# Patient Record
Sex: MALE | Race: WHITE | NOT HISPANIC OR LATINO | URBAN - METROPOLITAN AREA
[De-identification: names, ages, dates, MRNs, and addresses within clinical notes are randomized per-mention and may not be internally consistent; named-entity substitution may affect disease eponyms.]

---

## 2023-09-21 ENCOUNTER — HOSPITAL ENCOUNTER (OUTPATIENT)
Dept: DATA CONVERSION | Facility: HOSPITAL | Age: 68
Discharge: HOME | End: 2023-09-21
Payer: MEDICARE

## 2023-09-21 DIAGNOSIS — M47.812 SPONDYLOSIS WITHOUT MYELOPATHY OR RADICULOPATHY, CERVICAL REGION: ICD-10-CM

## 2024-01-02 PROCEDURE — 0753T DGTZ GLS MCRSCP SLD LEVEL IV: CPT

## 2024-01-02 PROCEDURE — 88305 TISSUE EXAM BY PATHOLOGIST: CPT | Performed by: PATHOLOGY

## 2024-01-02 PROCEDURE — 88313 SPECIAL STAINS GROUP 2: CPT | Performed by: PATHOLOGY

## 2024-01-02 PROCEDURE — 0757T DGTZ GLS MCRSCP SL SPC GRPII: CPT

## 2024-01-02 PROCEDURE — 88305 TISSUE EXAM BY PATHOLOGIST: CPT

## 2024-01-02 PROCEDURE — 88313 SPECIAL STAINS GROUP 2: CPT

## 2024-01-04 ENCOUNTER — LAB REQUISITION (OUTPATIENT)
Dept: LAB | Facility: HOSPITAL | Age: 69
End: 2024-01-04
Payer: MEDICARE

## 2024-01-04 DIAGNOSIS — M06.9 RHEUMATOID ARTHRITIS, UNSPECIFIED (MULTI): ICD-10-CM

## 2024-01-04 DIAGNOSIS — G56.01 CARPAL TUNNEL SYNDROME, RIGHT UPPER LIMB: ICD-10-CM

## 2024-01-10 LAB
LABORATORY COMMENT REPORT: NORMAL
PATH REPORT.FINAL DX SPEC: NORMAL
PATH REPORT.GROSS SPEC: NORMAL
PATH REPORT.MICROSCOPIC SPEC OTHER STN: NORMAL
PATH REPORT.RELEVANT HX SPEC: NORMAL
PATH REPORT.TOTAL CANCER: NORMAL

## 2025-06-02 ENCOUNTER — HOSPITAL ENCOUNTER (OUTPATIENT)
Dept: RADIOLOGY | Facility: HOSPITAL | Age: 70
Discharge: HOME | End: 2025-06-02
Payer: MEDICARE

## 2025-06-02 DIAGNOSIS — M75.122 COMPLETE ROTATOR CUFF TEAR OR RUPTURE OF LEFT SHOULDER, NOT SPECIFIED AS TRAUMATIC: ICD-10-CM

## 2025-06-02 PROCEDURE — 73221 MRI JOINT UPR EXTREM W/O DYE: CPT | Mod: LT

## 2025-06-02 PROCEDURE — 73221 MRI JOINT UPR EXTREM W/O DYE: CPT | Mod: LEFT SIDE | Performed by: RADIOLOGY

## 2025-07-02 ENCOUNTER — HOSPITAL ENCOUNTER (OUTPATIENT)
Dept: RADIOLOGY | Facility: HOSPITAL | Age: 70
Discharge: HOME | End: 2025-07-02
Payer: MEDICARE

## 2025-07-02 DIAGNOSIS — M75.122 COMPLETE ROTATOR CUFF TEAR OR RUPTURE OF LEFT SHOULDER, NOT SPECIFIED AS TRAUMATIC: ICD-10-CM

## 2025-07-02 PROCEDURE — 73200 CT UPPER EXTREMITY W/O DYE: CPT | Mod: LT

## 2025-07-02 PROCEDURE — 73200 CT UPPER EXTREMITY W/O DYE: CPT | Mod: LEFT SIDE | Performed by: RADIOLOGY

## 2025-07-17 ENCOUNTER — PRE-ADMISSION TESTING (OUTPATIENT)
Dept: PREADMISSION TESTING | Facility: HOSPITAL | Age: 70
End: 2025-07-17
Payer: MEDICARE

## 2025-07-17 VITALS
HEIGHT: 70 IN | OXYGEN SATURATION: 98 % | DIASTOLIC BLOOD PRESSURE: 69 MMHG | WEIGHT: 295 LBS | HEART RATE: 76 BPM | RESPIRATION RATE: 16 BRPM | BODY MASS INDEX: 42.23 KG/M2 | TEMPERATURE: 96.6 F | SYSTOLIC BLOOD PRESSURE: 135 MMHG

## 2025-07-17 DIAGNOSIS — I10 HYPERTENSION, UNSPECIFIED TYPE: ICD-10-CM

## 2025-07-17 DIAGNOSIS — Z01.818 PREOP TESTING: Primary | ICD-10-CM

## 2025-07-17 LAB
ANION GAP SERPL CALCULATED.3IONS-SCNC: 14 MMOL/L (ref 10–20)
ATRIAL RATE: 71 BPM
BASOPHILS # BLD AUTO: 0.06 X10*3/UL (ref 0–0.1)
BASOPHILS NFR BLD AUTO: 1.1 %
BUN SERPL-MCNC: 20 MG/DL (ref 6–23)
CALCIUM SERPL-MCNC: 9.4 MG/DL (ref 8.6–10.3)
CHLORIDE SERPL-SCNC: 103 MMOL/L (ref 98–107)
CO2 SERPL-SCNC: 27 MMOL/L (ref 21–32)
CREAT SERPL-MCNC: 0.87 MG/DL (ref 0.5–1.3)
EGFRCR SERPLBLD CKD-EPI 2021: >90 ML/MIN/1.73M*2
EOSINOPHIL # BLD AUTO: 0.25 X10*3/UL (ref 0–0.7)
EOSINOPHIL NFR BLD AUTO: 4.7 %
ERYTHROCYTE [DISTWIDTH] IN BLOOD BY AUTOMATED COUNT: 13.3 % (ref 11.5–14.5)
GLUCOSE SERPL-MCNC: 127 MG/DL (ref 74–99)
HCT VFR BLD AUTO: 41 % (ref 41–52)
HGB BLD-MCNC: 13.4 G/DL (ref 13.5–17.5)
IMM GRANULOCYTES # BLD AUTO: 0.03 X10*3/UL (ref 0–0.7)
IMM GRANULOCYTES NFR BLD AUTO: 0.6 % (ref 0–0.9)
LYMPHOCYTES # BLD AUTO: 1.71 X10*3/UL (ref 1.2–4.8)
LYMPHOCYTES NFR BLD AUTO: 32 %
MCH RBC QN AUTO: 32.1 PG (ref 26–34)
MCHC RBC AUTO-ENTMCNC: 32.7 G/DL (ref 32–36)
MCV RBC AUTO: 98 FL (ref 80–100)
MONOCYTES # BLD AUTO: 0.52 X10*3/UL (ref 0.1–1)
MONOCYTES NFR BLD AUTO: 9.7 %
NEUTROPHILS # BLD AUTO: 2.77 X10*3/UL (ref 1.2–7.7)
NEUTROPHILS NFR BLD AUTO: 51.9 %
NRBC BLD-RTO: 0 /100 WBCS (ref 0–0)
P AXIS: 49 DEGREES
P OFFSET: 169 MS
P ONSET: 115 MS
PLATELET # BLD AUTO: 218 X10*3/UL (ref 150–450)
POTASSIUM SERPL-SCNC: 4.2 MMOL/L (ref 3.5–5.3)
PR INTERVAL: 210 MS
Q ONSET: 220 MS
QRS COUNT: 12 BEATS
QRS DURATION: 90 MS
QT INTERVAL: 382 MS
QTC CALCULATION(BAZETT): 415 MS
QTC FREDERICIA: 404 MS
R AXIS: 28 DEGREES
RBC # BLD AUTO: 4.18 X10*6/UL (ref 4.5–5.9)
SODIUM SERPL-SCNC: 140 MMOL/L (ref 136–145)
T AXIS: 50 DEGREES
T OFFSET: 411 MS
VENTRICULAR RATE: 71 BPM
WBC # BLD AUTO: 5.3 X10*3/UL (ref 4.4–11.3)

## 2025-07-17 PROCEDURE — 93005 ELECTROCARDIOGRAM TRACING: CPT

## 2025-07-17 PROCEDURE — 93010 ELECTROCARDIOGRAM REPORT: CPT | Performed by: INTERNAL MEDICINE

## 2025-07-17 PROCEDURE — 80048 BASIC METABOLIC PNL TOTAL CA: CPT

## 2025-07-17 PROCEDURE — 87081 CULTURE SCREEN ONLY: CPT | Mod: TRILAB

## 2025-07-17 PROCEDURE — 85025 COMPLETE CBC W/AUTO DIFF WBC: CPT

## 2025-07-17 PROCEDURE — 36415 COLL VENOUS BLD VENIPUNCTURE: CPT

## 2025-07-17 RX ORDER — HYDROCODONE BITARTRATE AND ACETAMINOPHEN 10; 325 MG/1; MG/1
1 TABLET ORAL EVERY 4 HOURS PRN
COMMUNITY

## 2025-07-17 RX ORDER — CARISOPRODOL 350 MG/1
350 TABLET ORAL 3 TIMES DAILY PRN
COMMUNITY

## 2025-07-17 RX ORDER — CHLORHEXIDINE GLUCONATE ORAL RINSE 1.2 MG/ML
SOLUTION DENTAL
Qty: 473 ML | Refills: 0 | Status: SHIPPED | OUTPATIENT
Start: 2025-07-17 | End: 2025-08-01

## 2025-07-17 RX ORDER — DEXTROMETHORPHAN HYDROBROMIDE, GUAIFENESIN 5; 100 MG/5ML; MG/5ML
1300 LIQUID ORAL EVERY 8 HOURS PRN
COMMUNITY

## 2025-07-17 RX ORDER — LISINOPRIL 40 MG/1
40 TABLET ORAL DAILY
COMMUNITY

## 2025-07-17 RX ORDER — VERAPAMIL HYDROCHLORIDE 180 MG/1
180 CAPSULE, DELAYED RELEASE ORAL DAILY
COMMUNITY

## 2025-07-17 RX ORDER — NALOXONE HYDROCHLORIDE 4 MG/.1ML
1 SPRAY NASAL AS NEEDED
COMMUNITY
Start: 2025-06-11

## 2025-07-17 RX ORDER — CYANOCOBALAMIN 1000 UG/ML
1000 INJECTION, SOLUTION INTRAMUSCULAR; SUBCUTANEOUS ONCE
COMMUNITY

## 2025-07-17 RX ORDER — AMOXICILLIN 500 MG/1
CAPSULE ORAL
COMMUNITY

## 2025-07-17 ASSESSMENT — DUKE ACTIVITY SCORE INDEX (DASI)
CAN YOU WALK INDOORS, SUCH AS AROUND YOUR HOUSE: YES
CAN YOU PARTICIPATE IN STRENOUS SPORTS LIKE SWIMMING, SINGLES TENNIS, FOOTBALL, BASKETBALL, OR SKIING: NO
CAN YOU DO YARD WORK LIKE RAKING LEAVES, WEEDING OR PUSHING A MOWER: NO
CAN YOU CLIMB A FLIGHT OF STAIRS OR WALK UP A HILL: YES
CAN YOU DO MODERATE WORK AROUND THE HOUSE LIKE VACUUMING, SWEEPING FLOORS OR CARRYING GROCERIES: YES
CAN YOU TAKE CARE OF YOURSELF (EAT, DRESS, BATHE, OR USE TOILET): YES
CAN YOU PARTICIPATE IN MODERATE RECREATIONAL ACTIVITIES LIKE GOLF, BOWLING, DANCING, DOUBLES TENNIS OR THROWING A BASEBALL OR FOOTBALL: NO
CAN YOU HAVE SEXUAL RELATIONS: YES
CAN YOU DO LIGHT WORK AROUND THE HOUSE LIKE DUSTING OR WASHING DISHES: YES
TOTAL_SCORE: 24.2
CAN YOU DO HEAVY WORK AROUND THE HOUSE LIKE SCRUBBING FLOORS OR LIFTING AND MOVING HEAVY FURNITURE: NO
CAN YOU WALK A BLOCK OR TWO ON LEVEL GROUND: YES
DASI METS SCORE: 5.7
CAN YOU RUN A SHORT DISTANCE: NO

## 2025-07-17 ASSESSMENT — ENCOUNTER SYMPTOMS
RESPIRATORY NEGATIVE: 1
NEUROLOGICAL NEGATIVE: 1
BACK PAIN: 1
CARDIOVASCULAR NEGATIVE: 1
ARTHRALGIAS: 1
HEMATOLOGIC/LYMPHATIC NEGATIVE: 1
GASTROINTESTINAL NEGATIVE: 1
ENDOCRINE NEGATIVE: 1
EYES NEGATIVE: 1
PSYCHIATRIC NEGATIVE: 1
CONSTITUTIONAL NEGATIVE: 1

## 2025-07-17 NOTE — CPM/PAT H&P
CPM/PAT Evaluation       Name: Abhinav FABIAN Mandie (Augustinewill FABIAN [facility])  /Age: 1955/69 y.o.     In-Person       Chief Complaint: left shoulder pain     HPI    Pt is a 69 year old male with left shoulder pain. Pt reports he started experiencing left shoulder pain about one year ago that continues to worsening. Pt stated he has history of left shoulder rotator cuff repair surgery. Pt describes the pain as an aching sharp pain that radiates from his left shoulder to his left elbow. He is unable to raise his left arm above shoulder level. Pt denies weakness, numbness, and tingling in his left hand. Pt takes Norco for his chronic back pain and it helps mildly decrease his left shoulder pain. Pt was examined by his surgeon and has been scheduled for left total shoulder reverse arthroplasty. Pt denies CP, SOB, or dizziness.     Past Medical History:   Diagnosis Date    Amyloidosis     Depression     Hypertension     Kidney stones     Neuropathy     Spinal stenosis      Past Surgical History:   Procedure Laterality Date    ANKLE SURGERY Left     APPENDECTOMY      CARDIAC CATHETERIZATION      60% lesion in LAD, Normal EF; no intervention required.    CARPAL TUNNEL RELEASE Bilateral     FOOT SURGERY Left     KIDNEY STONE SURGERY      OTHER SURGICAL HISTORY  2020    Thoracic and lumbar vertebral fusion x 7 surgeries    OTHER SURGICAL HISTORY Bilateral 2020    Knee replacement    OTHER SURGICAL HISTORY  2020    ORIF metatarsal    OTHER SURGICAL HISTORY  2020    Neck surgery x 2 surgeries    OTHER SURGICAL HISTORY  2020    Appendectomy    OTHER SURGICAL HISTORY  2020    Shoulder replacement    ROTATOR CUFF REPAIR Left     UMBILICAL HERNIA REPAIR       Social History     Tobacco Use    Smoking status: Never    Smokeless tobacco: Never   Substance Use Topics    Alcohol use: Yes     Alcohol/week: 2.0 standard drinks of alcohol     Types: 2 Glasses of wine per week     Social History     Substance  and Sexual Activity   Drug Use Never     Patient  has no history on file for sexual activity.    Family History[1]    Allergies   Allergen Reactions    Hydromorphone Headache    Gabapentin Swelling     Current Outpatient Medications   Medication Sig Dispense Refill    naloxone (Narcan) 4 mg/0.1 mL nasal spray Administer 1 spray (4 mg) into affected nostril(s) if needed.      acetaminophen (Tylenol 8 HOUR) 650 mg ER tablet Take 2 tablets (1,300 mg) by mouth every 8 hours if needed for mild pain (1 - 3). Do not crush, chew, or split.      amoxicillin (Amoxil) 500 mg capsule TAKE 4 CAPSULES 1 HOUR PRIOR TO DENTAL WORK      carisoprodol (Soma) 350 mg tablet Take 1 tablet (350 mg) by mouth 3 times a day as needed for muscle spasms.      chlorhexidine (Peridex) 0.12 % solution Use as directed. 473 mL 0    cyanocobalamin (Vitamin B-12) 1,000 mcg/mL injection Inject 1 mL (1,000 mcg) into the muscle 1 time. Twice per month      HYDROcodone-acetaminophen (Norco)  mg tablet Take 1 tablet by mouth every 4 hours if needed.      lisinopril 40 mg tablet Take 1 tablet (40 mg) by mouth once daily.      verapamil ER (Veralan PM) 180 mg 24 hr capsule Take 1 capsule (180 mg) by mouth once daily.       No current facility-administered medications for this visit.     Review of Systems   Constitutional: Negative.    HENT: Negative.     Eyes: Negative.    Respiratory: Negative.     Cardiovascular: Negative.    Gastrointestinal: Negative.    Endocrine: Negative.    Genitourinary: Negative.    Musculoskeletal:  Positive for arthralgias and back pain.        Left shoulder pain and decreased ROM   Skin: Negative.    Neurological: Negative.    Hematological: Negative.         Pt has amyloidosis   Psychiatric/Behavioral: Negative.       Physical Exam  Vitals reviewed.   Constitutional:       Appearance: He is morbidly obese.   HENT:      Head: Normocephalic and atraumatic.      Nose: Nose normal.      Mouth/Throat:      Mouth: Mucous  "membranes are moist.      Pharynx: Oropharynx is clear.     Eyes:      Extraocular Movements: Extraocular movements intact.      Conjunctiva/sclera: Conjunctivae normal.      Pupils: Pupils are equal, round, and reactive to light.       Cardiovascular:      Rate and Rhythm: Normal rate and regular rhythm.      Pulses: Normal pulses.      Heart sounds: Normal heart sounds.   Pulmonary:      Effort: Pulmonary effort is normal. No respiratory distress.      Breath sounds: Normal breath sounds. No wheezing, rhonchi or rales.   Abdominal:      Palpations: Abdomen is soft.      Tenderness: There is no abdominal tenderness. There is no guarding or rebound.     Musculoskeletal:      Cervical back: Normal range of motion and neck supple.      Right lower leg: Edema (trace edema) present.      Left lower leg: Edema (trace edema) present.      Comments: Left shoulder pain with ROM; decreased left shoulder ROM. Unable to overhead lift. Pt ambulates with a cane d/t his chronic lower back pain.      Skin:     General: Skin is warm and dry.     Neurological:      General: No focal deficit present.      Mental Status: He is alert and oriented to person, place, and time. Mental status is at baseline.     Psychiatric:         Mood and Affect: Mood normal.         Behavior: Behavior normal.         Thought Content: Thought content normal.         Judgment: Judgment normal.          PAT AIRWAY:   Airway:     Mallampati::  III    TM distance::  >3 FB    Neck ROM::  Limited  normal      Visit Vitals  /69   Pulse 76   Temp 35.9 °C (96.6 °F) (Temporal)   Resp 16   Ht 1.778 m (5' 10\")   Wt 134 kg (295 lb)   SpO2 98%   BMI 42.33 kg/m²   Smoking Status Never   BSA 2.57 m²     ASA: 3  CHADS: 2.8%  RCRI: 0.4%  Ariscat: 1.6%  DASI Risk Score      Flowsheet Row Pre-Admission Testing from 7/17/2025 in [facility]   Can you take care of yourself (eat, dress, bathe, or use toilet)?  2.75 filed at 07/17/2025 0850   Can you walk " indoors, such as around your house? 1.75 filed at 07/17/2025 0850   Can you walk a block or two on level ground?  2.75 filed at 07/17/2025 0850   Can you climb a flight of stairs or walk up a hill? 5.5 filed at 07/17/2025 0850   Can you run a short distance? 0 filed at 07/17/2025 0850   Can you do light work around the house like dusting or washing dishes? 2.7 filed at 07/17/2025 0850   Can you do moderate work around the house like vacuuming, sweeping floors or carrying groceries? 3.5 filed at 07/17/2025 0850   Can you do heavy work around the house like scrubbing floors or lifting and moving heavy furniture?  0 filed at 07/17/2025 0850   Can you do yard work like raking leaves, weeding or pushing a mower? 0 filed at 07/17/2025 0850   Can you have sexual relations? 5.25 filed at 07/17/2025 0850   Can you participate in moderate recreational activities like golf, bowling, dancing, doubles tennis or throwing a baseball or football? 0 filed at 07/17/2025 0850   Can you participate in strenous sports like swimming, singles tennis, football, basketball, or skiing? 0 filed at 07/17/2025 0850   DASI SCORE 24.2 filed at 07/17/2025 0850   METS Score (Will be calculated only when all the questions are answered) 5.7 filed at 07/17/2025 0850     Caprini DVT Assessment    No data to display  Modified Frailty Index    No data to display  [id number] Stroke Risk Points  Current as of just now        N/A 0 to 9 Points:      Last Change: N/A          The [id number] risk score (Lip GH, et al. 2009. © 2010 American College [employer] Chest [employer]) quantifies the risk of stroke for a patient with atrial fibrillation. For patients without atrial fibrillation or under the age of 18 this score appears as N/A. Higher score values generally indicate higher risk of stroke.        This score is not applicable to this patient. Components are not calculated.          Revised Cardiac Risk Index      Flowsheet Row Pre-Admission Testing from  7/17/2025 in [facility]   High-Risk Surgery (Intraperitoneal, Intrathoracic,Suprainguinal vascular) 0 filed at 07/17/2025 0918   History of ischemic heart disease (History of MI, History of positive exercuse test, Current chest paint considered due to myocardial ischemia, Use of nitrate therapy, ECG with pathological Q Waves) 0 filed at 07/17/2025 0918   History of congestive heart failure (pulmonary edemia, bilateral rales or S3 gallop, Paroxysmal nocturnal dyspnea, CXR showing pulmonary vascular redistribution) 0 filed at 07/17/2025 0918   History of cerebrovascular disease (Prior TIA or stroke) 0 filed at 07/17/2025 0918   Pre-operative insulin treatment 0 filed at 07/17/2025 0918   Pre-operative creatinine>2 mg/dl 0 filed at 07/17/2025 0918   Revised Cardiac Risk Calculator 0 filed at 07/17/2025 0918     Apfel Simplified Score    No data to display  Risk Analysis Index Results This Encounter    No data found in the last 10 encounters.       Stop Bang Score      Flowsheet Row Pre-Admission Testing from 7/17/2025 in [facility]   Do you snore loudly? 0 filed at 07/17/2025 0850   Do you often feel tired or fatigued after your sleep? 0 filed at 07/17/2025 0850   Has anyone ever observed you stop breathing in your sleep? 0 filed at 07/17/2025 0850   Do you have or are you being treated for high blood pressure? 1 filed at 07/17/2025 0850   Recent BMI (Calculated) 42.3 filed at 07/17/2025 0850   Is BMI greater than 35 kg/m2? 1=Yes filed at 07/17/2025 0850   Age older than 50 years old? 1=Yes filed at 07/17/2025 0850   Is your neck circumference greater than 17 inches (Male) or 16 inches (Female)? 1 filed at 07/17/2025 0850   Gender - Male 1=Yes filed at 07/17/2025 0850   STOP-BANG Total Score 5 filed at 07/17/2025 0850     Prodigy: High Risk  Total Score: 19              Prodigy Age Score      Prodigy Gender Score     Prodigy Previous Opioid Use Score           ARISCAT Score for  Postoperative Pulmonary Complications    No data to display  Wilcox Perioperative Risk for Myocardial Infarction or Cardiac Arrest (ANAHI)    No data to display      Assessment and Plan:     Nontraumatic complete tear of rotator cuff, left: Total Shoulder Reverse Arthroplasty left.  HTN: Pt is taking verapamil ER and lisinopril.   CAD: cardiac cath 2016 60% lesion in LAD, normal EF; no intervention required- found in old record in [employer].   Amyloidosis: Pt followed up with both a cardiologist and hematologist. Pt has completed a work up with hematology and cardiology. Pt follows up in one year. Amyloid cardiac nuclear scan 8/9/2024: Not Consistent with TTR amyloidosis.   Chronic back pain: history of multiple neck and back surgeries. Pt takes Norco to help manage his pain.  BMI: 42.33    CBC, BMP, and MRSA swab collected in PAT.  EKG performed in PAT.    Echocardiogram 7/18/2024  - Exam indication: Amyloidosis   - The left ventricle is normal in size. Left ventricular systolic function is normal. EF = 55 ± 5% (2D biplane) Normal left ventricular diastolic function.   - The right ventricle is mildly dilated. Right ventricular systolic function is normal.   - The right atrial cavity is mildly dilated.   - Estimated right ventricular systolic pressure is not reported due to an insufficient tricuspid regurgitation signal. Estimated right atrial pressure is 3   mmHg based on IVC assessment.   - The patient has not had a prior CC echocardiographic exam for comparison. No   echo evidence for significant restrictive heart disease     MAEGAN Knott-CNP         [1] No family history on file.

## 2025-07-17 NOTE — H&P (VIEW-ONLY)
CPM/PAT Evaluation       Name: Abhinav FABIAN Mandie (Augustinewill FABIAN [facility])  /Age: 1955/69 y.o.     In-Person       Chief Complaint: left shoulder pain     HPI    Pt is a 69 year old male with left shoulder pain. Pt reports he started experiencing left shoulder pain about one year ago that continues to worsening. Pt stated he has history of left shoulder rotator cuff repair surgery. Pt describes the pain as an aching sharp pain that radiates from his left shoulder to his left elbow. He is unable to raise his left arm above shoulder level. Pt denies weakness, numbness, and tingling in his left hand. Pt takes Norco for his chronic back pain and it helps mildly decrease his left shoulder pain. Pt was examined by his surgeon and has been scheduled for left total shoulder reverse arthroplasty. Pt denies CP, SOB, or dizziness.     Past Medical History:   Diagnosis Date    Amyloidosis     Depression     Hypertension     Kidney stones     Neuropathy     Spinal stenosis      Past Surgical History:   Procedure Laterality Date    ANKLE SURGERY Left     APPENDECTOMY      CARDIAC CATHETERIZATION      60% lesion in LAD, Normal EF; no intervention required.    CARPAL TUNNEL RELEASE Bilateral     FOOT SURGERY Left     KIDNEY STONE SURGERY      OTHER SURGICAL HISTORY  2020    Thoracic and lumbar vertebral fusion x 7 surgeries    OTHER SURGICAL HISTORY Bilateral 2020    Knee replacement    OTHER SURGICAL HISTORY  2020    ORIF metatarsal    OTHER SURGICAL HISTORY  2020    Neck surgery x 2 surgeries    OTHER SURGICAL HISTORY  2020    Appendectomy    OTHER SURGICAL HISTORY  2020    Shoulder replacement    ROTATOR CUFF REPAIR Left     UMBILICAL HERNIA REPAIR       Social History     Tobacco Use    Smoking status: Never    Smokeless tobacco: Never   Substance Use Topics    Alcohol use: Yes     Alcohol/week: 2.0 standard drinks of alcohol     Types: 2 Glasses of wine per week     Social History     Substance  and Sexual Activity   Drug Use Never     Patient  has no history on file for sexual activity.    Family History[1]    Allergies   Allergen Reactions    Hydromorphone Headache    Gabapentin Swelling     Current Outpatient Medications   Medication Sig Dispense Refill    naloxone (Narcan) 4 mg/0.1 mL nasal spray Administer 1 spray (4 mg) into affected nostril(s) if needed.      acetaminophen (Tylenol 8 HOUR) 650 mg ER tablet Take 2 tablets (1,300 mg) by mouth every 8 hours if needed for mild pain (1 - 3). Do not crush, chew, or split.      amoxicillin (Amoxil) 500 mg capsule TAKE 4 CAPSULES 1 HOUR PRIOR TO DENTAL WORK      carisoprodol (Soma) 350 mg tablet Take 1 tablet (350 mg) by mouth 3 times a day as needed for muscle spasms.      chlorhexidine (Peridex) 0.12 % solution Use as directed. 473 mL 0    cyanocobalamin (Vitamin B-12) 1,000 mcg/mL injection Inject 1 mL (1,000 mcg) into the muscle 1 time. Twice per month      HYDROcodone-acetaminophen (Norco)  mg tablet Take 1 tablet by mouth every 4 hours if needed.      lisinopril 40 mg tablet Take 1 tablet (40 mg) by mouth once daily.      verapamil ER (Veralan PM) 180 mg 24 hr capsule Take 1 capsule (180 mg) by mouth once daily.       No current facility-administered medications for this visit.     Review of Systems   Constitutional: Negative.    HENT: Negative.     Eyes: Negative.    Respiratory: Negative.     Cardiovascular: Negative.    Gastrointestinal: Negative.    Endocrine: Negative.    Genitourinary: Negative.    Musculoskeletal:  Positive for arthralgias and back pain.        Left shoulder pain and decreased ROM   Skin: Negative.    Neurological: Negative.    Hematological: Negative.         Pt has amyloidosis   Psychiatric/Behavioral: Negative.       Physical Exam  Vitals reviewed.   Constitutional:       Appearance: He is morbidly obese.   HENT:      Head: Normocephalic and atraumatic.      Nose: Nose normal.      Mouth/Throat:      Mouth: Mucous  "membranes are moist.      Pharynx: Oropharynx is clear.     Eyes:      Extraocular Movements: Extraocular movements intact.      Conjunctiva/sclera: Conjunctivae normal.      Pupils: Pupils are equal, round, and reactive to light.       Cardiovascular:      Rate and Rhythm: Normal rate and regular rhythm.      Pulses: Normal pulses.      Heart sounds: Normal heart sounds.   Pulmonary:      Effort: Pulmonary effort is normal. No respiratory distress.      Breath sounds: Normal breath sounds. No wheezing, rhonchi or rales.   Abdominal:      Palpations: Abdomen is soft.      Tenderness: There is no abdominal tenderness. There is no guarding or rebound.     Musculoskeletal:      Cervical back: Normal range of motion and neck supple.      Right lower leg: Edema (trace edema) present.      Left lower leg: Edema (trace edema) present.      Comments: Left shoulder pain with ROM; decreased left shoulder ROM. Unable to overhead lift. Pt ambulates with a cane d/t his chronic lower back pain.      Skin:     General: Skin is warm and dry.     Neurological:      General: No focal deficit present.      Mental Status: He is alert and oriented to person, place, and time. Mental status is at baseline.     Psychiatric:         Mood and Affect: Mood normal.         Behavior: Behavior normal.         Thought Content: Thought content normal.         Judgment: Judgment normal.          PAT AIRWAY:   Airway:     Mallampati::  III    TM distance::  >3 FB    Neck ROM::  Limited  normal      Visit Vitals  /69   Pulse 76   Temp 35.9 °C (96.6 °F) (Temporal)   Resp 16   Ht 1.778 m (5' 10\")   Wt 134 kg (295 lb)   SpO2 98%   BMI 42.33 kg/m²   Smoking Status Never   BSA 2.57 m²     ASA: 3  CHADS: 2.8%  RCRI: 0.4%  Ariscat: 1.6%  DASI Risk Score      Flowsheet Row Pre-Admission Testing from 7/17/2025 in [facility]   Can you take care of yourself (eat, dress, bathe, or use toilet)?  2.75 filed at 07/17/2025 0850   Can you walk " indoors, such as around your house? 1.75 filed at 07/17/2025 0850   Can you walk a block or two on level ground?  2.75 filed at 07/17/2025 0850   Can you climb a flight of stairs or walk up a hill? 5.5 filed at 07/17/2025 0850   Can you run a short distance? 0 filed at 07/17/2025 0850   Can you do light work around the house like dusting or washing dishes? 2.7 filed at 07/17/2025 0850   Can you do moderate work around the house like vacuuming, sweeping floors or carrying groceries? 3.5 filed at 07/17/2025 0850   Can you do heavy work around the house like scrubbing floors or lifting and moving heavy furniture?  0 filed at 07/17/2025 0850   Can you do yard work like raking leaves, weeding or pushing a mower? 0 filed at 07/17/2025 0850   Can you have sexual relations? 5.25 filed at 07/17/2025 0850   Can you participate in moderate recreational activities like golf, bowling, dancing, doubles tennis or throwing a baseball or football? 0 filed at 07/17/2025 0850   Can you participate in strenous sports like swimming, singles tennis, football, basketball, or skiing? 0 filed at 07/17/2025 0850   DASI SCORE 24.2 filed at 07/17/2025 0850   METS Score (Will be calculated only when all the questions are answered) 5.7 filed at 07/17/2025 0850     Caprini DVT Assessment    No data to display  Modified Frailty Index    No data to display  [id number] Stroke Risk Points  Current as of just now        N/A 0 to 9 Points:      Last Change: N/A          The [id number] risk score (Lip GH, et al. 2009. © 2010 American College [employer] Chest [employer]icians) quantifies the risk of stroke for a patient with atrial fibrillation. For patients without atrial fibrillation or under the age of 18 this score appears as N/A. Higher score values generally indicate higher risk of stroke.        This score is not applicable to this patient. Components are not calculated.          Revised Cardiac Risk Index      Flowsheet Row Pre-Admission Testing from  7/17/2025 in [facility]   High-Risk Surgery (Intraperitoneal, Intrathoracic,Suprainguinal vascular) 0 filed at 07/17/2025 0918   History of ischemic heart disease (History of MI, History of positive exercuse test, Current chest paint considered due to myocardial ischemia, Use of nitrate therapy, ECG with pathological Q Waves) 0 filed at 07/17/2025 0918   History of congestive heart failure (pulmonary edemia, bilateral rales or S3 gallop, Paroxysmal nocturnal dyspnea, CXR showing pulmonary vascular redistribution) 0 filed at 07/17/2025 0918   History of cerebrovascular disease (Prior TIA or stroke) 0 filed at 07/17/2025 0918   Pre-operative insulin treatment 0 filed at 07/17/2025 0918   Pre-operative creatinine>2 mg/dl 0 filed at 07/17/2025 0918   Revised Cardiac Risk Calculator 0 filed at 07/17/2025 0918     Apfel Simplified Score    No data to display  Risk Analysis Index Results This Encounter    No data found in the last 10 encounters.       Stop Bang Score      Flowsheet Row Pre-Admission Testing from 7/17/2025 in [facility]   Do you snore loudly? 0 filed at 07/17/2025 0850   Do you often feel tired or fatigued after your sleep? 0 filed at 07/17/2025 0850   Has anyone ever observed you stop breathing in your sleep? 0 filed at 07/17/2025 0850   Do you have or are you being treated for high blood pressure? 1 filed at 07/17/2025 0850   Recent BMI (Calculated) 42.3 filed at 07/17/2025 0850   Is BMI greater than 35 kg/m2? 1=Yes filed at 07/17/2025 0850   Age older than 50 years old? 1=Yes filed at 07/17/2025 0850   Is your neck circumference greater than 17 inches (Male) or 16 inches (Female)? 1 filed at 07/17/2025 0850   Gender - Male 1=Yes filed at 07/17/2025 0850   STOP-BANG Total Score 5 filed at 07/17/2025 0850     Prodigy: High Risk  Total Score: 19              Prodigy Age Score      Prodigy Gender Score     Prodigy Previous Opioid Use Score           ARISCAT Score for  Postoperative Pulmonary Complications    No data to display  Wilcox Perioperative Risk for Myocardial Infarction or Cardiac Arrest (ANAHI)    No data to display      Assessment and Plan:     Nontraumatic complete tear of rotator cuff, left: Total Shoulder Reverse Arthroplasty left.  HTN: Pt is taking verapamil ER and lisinopril.   CAD: cardiac cath 2016 60% lesion in LAD, normal EF; no intervention required- found in old record in [employer].   Amyloidosis: Pt followed up with both a cardiologist and hematologist. Pt has completed a work up with hematology and cardiology. Pt follows up in one year. Amyloid cardiac nuclear scan 8/9/2024: Not Consistent with TTR amyloidosis.   Chronic back pain: history of multiple neck and back surgeries. Pt takes Norco to help manage his pain.  BMI: 42.33    CBC, BMP, and MRSA swab collected in PAT.  EKG performed in PAT.    Echocardiogram 7/18/2024  - Exam indication: Amyloidosis   - The left ventricle is normal in size. Left ventricular systolic function is normal. EF = 55 ± 5% (2D biplane) Normal left ventricular diastolic function.   - The right ventricle is mildly dilated. Right ventricular systolic function is normal.   - The right atrial cavity is mildly dilated.   - Estimated right ventricular systolic pressure is not reported due to an insufficient tricuspid regurgitation signal. Estimated right atrial pressure is 3   mmHg based on IVC assessment.   - The patient has not had a prior CC echocardiographic exam for comparison. No   echo evidence for significant restrictive heart disease     MAEGAN Knott-CNP         [1] No family history on file.

## 2025-07-17 NOTE — PREPROCEDURE INSTRUCTIONS
Preoperative Fasting Guidelines    Why must I stop eating and drinking near surgery time?  With sedation, food or liquid in your stomach can enter your lungs causing serious complications  Increases nausea and vomiting    When do I need to stop eating and drinking before my surgery?  Do not eat any food after midnight the night before your surgery/procedure.  You may have up to 13.5 ounces of clear liquid until TWO hours before your instructed arrival time to the hospital.  This includes water, black tea/coffee, (no milk or cream) apple juice, and electrolyte drinks (Gatorade)  You may chew gum until TWO hours before your surgery/procedure    PAT DISCHARGE INSTRUCTIONS    The Same Day Surgery (SDS) Department of the hospital where your procedure will be performed will contact you after 2:00 PM the day before your surgery. If you are scheduled on a Monday, or a Tuesday following a Monday holiday, they will call on the last business day prior to your surgery.  Please check your voicemail for any missed messages.    [place] [id number] [phone]  Second Floor      Please let your surgeon know if:      You develop any open sores, shingles, burning or painful urination as these may increase your risk of an infection.   You no longer wish to have the surgery.   Any other personal circumstances change that may lead to the need to cancel or defer this surgery-such as being sick or getting admitted to any hospital within one week of your planned procedure.    Your contact details change, such as a change of address or phone number.    Starting now:     Please DO NOT drink alcohol or smoke for 24 hours before surgery. It is well known that quitting smoking can make a huge difference to your health and recovery from surgery. The longer you abstain from smoking, the better your chances of a healthy recovery. If you need help with quitting, call  1-800-QUIT-NOW to be connected to a trained counselor who will discuss the best methods to help you quit.     Before your surgery:    Please stop all supplements 7 days prior to surgery. Or as directed by your surgeon.   Please stop taking NSAID pain medicine such as Advil and Motrin 7 days before surgery.    If you develop any fever, cough, cold, rashes, cuts, scratches, scrapes, urinary symptoms or infection anywhere on your body (including teeth and gums) prior to surgery, please call your surgeon’s office as soon as possible. This may require treatment to reduce the chance of cancellation on the day of surgery.    The day before your surgery:   DIET- Please follow the diet instructions at the top of your packet.   Get a good night’s rest.  Use the special soap for bathing if you have been instructed to use one.    Scheduled surgery times may change and you will be notified if this occurs - please check your personal voicemail for any updates.     On the morning of surgery:   Wear comfortable, loose fitting clothes which open in the front. Please do not wear moisturizers, creams, lotions, makeup or perfume.    Please bring with you to surgery:   Photo ID and insurance card   Current list of medicines and allergies   Pacemaker/ Defibrillator/Heart stent cards   CPAP machine and mask    Slings/ splints/ crutches   A copy of your complete advanced directive/DHPOA.    Please do NOT bring with you to surgery:   All jewelry and valuables should be left at home.   Prosthetic devices such as contact lenses, hearing aids, dentures, eyelash extensions, hairpins and body piercings must be removed prior to going in to the surgical suite.    After outpatient surgery:   A responsible adult MUST accompany you at the time of discharge and stay with you for 24 hours after your surgery. You may NOT drive yourself home after surgery.    Do not drive, operate machinery, make critical decisions or do activities that require  co-ordination or balance until after a night’s sleep.   Do not drink alcoholic beverages for 24 hours.   Instructions for resuming your medications will be provided by your surgeon.    CALL YOUR DOCTOR AFTER SURGERY IF YOU HAVE:     Chills and/or a fever of 101° F or higher.    Redness, swelling, pus or drainage from your surgical wound or a bad smell from the wound.    Lightheadedness, fainting or confusion.    Persistent vomiting (throwing up) and are not able to eat or drink for 12 hours.    Three or more loose, watery bowel movements in 24 hours (diarrhea).   Difficulty or pain while urinating( after non-urological surgery)    Pain and swelling in your legs, especially if it is only on one side.    Difficulty breathing or are breathing faster than normal.    Any new concerning symptoms.        Patient Information: Pre-Operative Infection Prevention Measures     Why did I have my nose, under my arms, and groin swabbed?  The purpose of the swab is to identify Staphylococcus aureus inside your nose or on your skin.  The swab was sent to the laboratory for culture.  A positive swab/culture for Staphylococcus aureus is called colonization or carriage.      What is Staphylococcus aureus?  Staphylococcus aureus, also known as “staph”, is a germ found on the skin or in the nose of healthy people.  Sometimes Staphylococcus aureus can get into the body and cause an infection.  This can be minor (such as pimples, boils, or other skin problems).  It might also be serious (such as a blood infection, pneumonia, or a surgical site infection).    What is Staphylococcus aureus colonization or carriage?  Colonization or carriage means that a person has the germ but is not sick from it.  These bacteria can be spread on the hands or when breathing or sneezing.    How is Staphylococcus aureus spread?  It is most often spread by close contact with a person or item that carries it.    What happens if my culture is positive for  Staphylococcus aureus?  Your doctor/medical team will use this information to guide any antibiotic treatment which may be necessary.  Regardless of the culture results, we will clean the inside of your nose with a betadine swab just before you have your surgery.      Will I get an infection if I have Staphylococcus aureus in my nose or on my skin?  Anyone can get an infection with Staphylococcus aureus.  However, the best way to reduce your risk of infection is to follow the instructions provided to you for the use of your CHG soap and dental rinse.        Patient Information: Oral/Dental Rinse    What is oral/dental rinse?   It is a mouthwash. It is a way of cleaning the mouth with a germ-killing solution before your surgery.  The solution contains chlorhexidine, commonly known as CHG.   It is used inside the mouth to kill a bacteria known as Staphylococcus aureus.  Let your doctor know if you are allergic to Chlorhexidine.    Why do I need to use CHG oral/dental rinse?  The CHG oral/dental rinse helps to kill a bacteria in your mouth known as Staphylococcus aureus.     This reduces the risk of infection at the surgical site.      Using your CHG oral/dental rinse  STEPS:  Use your CHG oral/dental rinse after you brush your teeth the night before (at bedtime) and the morning of your surgery.  Follow all directions on your prescription label.    Use the cap on the container to measure 15ml   Swish (gargle if you can) the mouthwash in your mouth for at least 30 seconds, (do not swallow) and spit out  After you use your CHG rinse, do not rinse your mouth with water, drink or eat.  Please refer to the prescription label for the appropriate time to resume oral intake      What side effects might I have using the CHG oral/dental rinse?  CHG rinse will stick to plaque on the teeth.  Brush and floss just before use.  Teeth brushing will help avoid staining of plaque during use.      Patient Information: Home Preoperative  Antibacterial Shower      What is a home preoperative antibacterial shower?  This shower is a way of cleaning the skin with a germ-killing solution before surgery.  The solution contains chlorhexidine, commonly known as CHG.  CHG is a skin cleanser with germ-killing ability.  Let your doctor know if you are allergic to chlorhexidine.    Why do I need to take a preoperative antibacterial shower?  Skin is not sterile.  It is best to try to make your skin as free of germs as possible before surgery.  Proper cleansing with a germ-killing soap before surgery can lower the number of germs on your skin.  This helps to reduce the risk of infection at the surgical site.  Following the instructions listed below will help you prepare your skin for surgery.      How do I use the solution?  Steps:  Begin using your CHG soap 5 days before your scheduled surgery on ________________________.    First, wash and rinse your hair using the CHG soap. Keep CHG soap away from ear canals and eyes.  Rinse completely, do not condition.  Hair extensions should be removed.  Wash your face with your normal soap and rinse.    Apply the CHG solution to a clean wet washcloth.  Turn the water off or move away from the water spray to avoid premature rinsing of the CHG soap as you are applying.   Firmly lather your entire body from the neck down.  Do not use on your face.  Pay special attention to the area(s) where your incision(s) will be located unless they are on your face.  Avoid scrubbing your skin too hard.  The important point is to have the CHG soap sit on your skin for 3 minutes.    When the 3 minutes are up, turn on the water and rinse the CHG solution off your body completely.   DO NOT wash with regular soap after you have used the CHG soap solution  Pat yourself dry with a clean, freshly-laundered towel.  DO NOT apply powders, deodorants, or lotions.  Dress in clean, freshly laundered nightclothes.    Be sure to sleep with clean, freshly  laundered sheets.  Be aware that CHG will cause stains on fabrics; if you wash them with bleach after use.  Rinse your washcloth and other linens that have contact with CHG completely.  Use only non-chlorine detergents to launder the items used.   The morning of surgery is the fifth day.  Repeat the above steps and dress in clean comfortable clothing     Whom should I contact if I have any questions regarding the use of CHG soap?  Call the [facility] at [phone] if you have any questions.                    Medication List            Accurate as of July 17, 2025  8:54 AM. Always use your most recent med list.                acetaminophen 650 mg ER tablet  Commonly known as: Tylenol 8 HOUR  Medication Adjustments for Surgery: Take/Use as prescribed     amoxicillin 500 mg capsule  Commonly known as: Amoxil     carisoprodol 350 mg tablet  Commonly known as: Soma  Medication Adjustments for Surgery: Take/Use as prescribed     cyanocobalamin 1,000 mcg/mL injection  Commonly known as: Vitamin B-12  Additional Medication Adjustments for Surgery: Take last dose 7 days before surgery     HYDROcodone-acetaminophen  mg tablet  Commonly known as: Norco  Medication Adjustments for Surgery: Take/Use as prescribed     lisinopril 40 mg tablet  Medication Adjustments for Surgery: Take last dose 1 day (24 hours) before surgery     naloxone 4 mg/0.1 mL nasal spray  Commonly known as: Narcan  Medication Adjustments for Surgery: Take/Use as prescribed     verapamil  mg 24 hr capsule  Commonly known as: Veralan PM  Medication Adjustments for Surgery: Take/Use as prescribed                              Patient and Family Education             Ways You Can Help Prevent Blood Clots             This handout explains some simple things you can do to help prevent blood clots.      Blood clots are blockages that can form in the body's veins. When a blood clot  forms in your deep veins, it may be called a deep vein thrombosis, or DVT for short. Blood clots can happen in any part of the body where blood flows, but they are most common in the arms and legs. If a piece of a blood clot breaks free and travels to the lungs, it is called a pulmonary embolus (PE). A PE can be a very serious problem.         Being in the hospital or having surgery can raise your chances of getting a blood clot because you may not be well enough to move around as much as you normally do.         Ways you can help prevent blood clots in the hospital         Wearing SCDs. SCDs stands for Sequential Compression Devices.   SCDs are special sleeves that wrap around your legs  They attach to a pump that fills them with air to gently squeeze your legs every few minutes.   This helps return the blood in your legs to your heart.   SCDs should only be taken off when walking or bathing.   SCDs may not be comfortable, but they can help save your life.               Wearing compression stockings - if your doctor orders them. These special snug fitting stockings gently squeeze your legs to help blood flow.       Walking. Walking helps move the blood in your legs.   If your doctor says it is ok, try walking the halls at least   5 times a day. Ask us to help you get up, so you don't fall.      Taking any blood thinning medicines your doctor orders.        Page 1 of 2     [facility]; 3/23   Ways you can help prevent blood clots at home       Wearing compression stockings - if your doctor orders them. ? Walking - to help move the blood in your legs.       Taking any blood thinning medicines your doctor orders.      Signs of a blood clot or PE      Tell your doctor or nurse know right away if you have of the problems listed below.    If you are at home, seek medical care right away. Call 911 for chest pain or problems breathing.               Signs of a blood clot (DVT) - such as pain,  swelling, redness  or warmth in your arm or leg      Signs of a pulmonary embolism (PE) - such as chest     pain or feeling short of breath            Preoperative Brain Exercises    What are brain exercises?  A brain exercise is any activity that engages your thinking (cognitive) skills.    What types of activities are considered brain exercises?  Jigsaw puzzles, crossword puzzles, word jumble, memory games, word search, and many more.  Many can be found free online or on your phone via a mobile tea.    Why should I do brain exercises before my surgery?  More recent research has shown brain exercise before surgery can lower the risk of postoperative delirium (confusion) which can be especially important for older adults.  Patients who did brain exercises for 5 to 10 hours the days before surgery, cut their risk of postoperative delirium in half up to 1 week after surgery.        [facility]    Preoperative Deep Breathing Exercises    Why it is important to do deep breathing exercises before my surgery?  Deep breathing exercises strengthen your breathing muscles.  This helps you to recover after your surgery and decreases the chance of breathing complications.      How are the deep breathing exercises done?  Sit straight with your back supported.  Breathe in deeply and slowly through your nose. Your lower rib cage should expand and your abdomen may move forward.  Hold that breath for 3 to 5 seconds.  Breathe out through pursed lips, slowly and completely.  Rest and repeat 10 times every hour while awake.  Rest longer if you become dizzy or lightheaded.      Patient Information: Incentive Spirometer  What is an incentive spirometer?  An incentive spirometer is a device used before and after surgery to “exercise” your lungs.  It helps you to take deeper breaths to expand your lungs.  Below is an example of a basic incentive spirometer.  The device you receive may differ slightly but they all function the  same.    Why do I need to use an incentive spirometer?  Using your incentive spirometer prepares your lungs for surgery and helps prevent lung problems after surgery.  How do I use my incentive spirometer?  When you're using your incentive spirometer, make sure to breathe through your mouth. If you breathe through your nose, the incentive spirometer won't work properly. You can hold your nose if you have trouble.  If you feel dizzy at any time, stop and rest. Try again at a later time.  Follow the steps below:  Set up your incentive spirometer, expand the flexible tubing and connect to the outlet.  Sit upright in a chair or bed. Hold the incentive spirometer at eye level.   Put the mouthpiece in your mouth and close your lips tightly around it. Slowly breathe out (exhale) completely.  Breathe in (inhale) slowly through your mouth as deeply as you can. As you take a breath, you will see the piston rise inside the large column. While the piston rises, the indicator should move upwards. It should stay in between the 2 arrows (see Figure).  Try to get the piston as high as you can, while keeping the indicator between the arrows.   If the indicator doesn't stay between the arrows, you're breathing either too fast or too slow.  When you get it as high as you can, hold your breath for 10 seconds, or as long as possible. While you're holding your breath, the piston will slowly fall to the base of the spirometer.  Once the piston reaches the bottom of the spirometer, breathe out slowly through your mouth. Rest for a few seconds.  Repeat 10 times. Try to get the piston to the same level with each breath.  Repeat every hour while awake  You can carefully clean the outside of the mouthpiece with an alcohol wipe or soap and water.

## 2025-07-19 LAB — STAPHYLOCOCCUS SPEC CULT: NORMAL

## 2025-07-28 RX ORDER — CEFAZOLIN SODIUM 2 G/100ML
2 INJECTION, SOLUTION INTRAVENOUS ONCE
OUTPATIENT
Start: 2025-07-31 | End: 2025-07-28

## 2025-08-05 ENCOUNTER — HOSPITAL ENCOUNTER (OUTPATIENT)
Facility: HOSPITAL | Age: 70
Setting detail: OUTPATIENT SURGERY
End: 2025-08-05
Attending: ORTHOPAEDIC SURGERY | Admitting: ORTHOPAEDIC SURGERY
Payer: MEDICARE

## 2025-08-08 ENCOUNTER — ANESTHESIA EVENT (OUTPATIENT)
Dept: OPERATING ROOM | Facility: HOSPITAL | Age: 70
End: 2025-08-08
Payer: MEDICARE

## 2025-08-08 ENCOUNTER — APPOINTMENT (OUTPATIENT)
Dept: RADIOLOGY | Facility: HOSPITAL | Age: 70
End: 2025-08-08
Payer: MEDICARE

## 2025-08-08 ENCOUNTER — ANESTHESIA (OUTPATIENT)
Dept: OPERATING ROOM | Facility: HOSPITAL | Age: 70
End: 2025-08-08
Payer: MEDICARE

## 2025-08-08 ENCOUNTER — HOSPITAL ENCOUNTER (OUTPATIENT)
Facility: HOSPITAL | Age: 70
Discharge: HOME | End: 2025-08-09
Attending: ORTHOPAEDIC SURGERY | Admitting: ORTHOPAEDIC SURGERY
Payer: MEDICARE

## 2025-08-08 DIAGNOSIS — M19.012 ARTHRITIS OF LEFT SHOULDER REGION: Primary | ICD-10-CM

## 2025-08-08 PROCEDURE — 3600000010 HC OR TIME - EACH INCREMENTAL 1 MINUTE - PROCEDURE LEVEL FIVE: Performed by: ORTHOPAEDIC SURGERY

## 2025-08-08 PROCEDURE — 2500000005 HC RX 250 GENERAL PHARMACY W/O HCPCS: Performed by: ORTHOPAEDIC SURGERY

## 2025-08-08 PROCEDURE — 97161 PT EVAL LOW COMPLEX 20 MIN: CPT | Mod: GP

## 2025-08-08 PROCEDURE — 2500000004 HC RX 250 GENERAL PHARMACY W/ HCPCS (ALT 636 FOR OP/ED): Performed by: ORTHOPAEDIC SURGERY

## 2025-08-08 PROCEDURE — 3600000005 HC OR TIME - INITIAL BASE CHARGE - PROCEDURE LEVEL FIVE: Performed by: ORTHOPAEDIC SURGERY

## 2025-08-08 PROCEDURE — 7100000011 HC EXTENDED STAY RECOVERY HOURLY - NURSING UNIT

## 2025-08-08 PROCEDURE — 2500000004 HC RX 250 GENERAL PHARMACY W/ HCPCS (ALT 636 FOR OP/ED): Performed by: ANESTHESIOLOGY

## 2025-08-08 PROCEDURE — L3670 SO ACRO/CLAV CAN WEB PRE OTS: HCPCS | Performed by: ORTHOPAEDIC SURGERY

## 2025-08-08 PROCEDURE — 2740000001 HC OR 274 NO HCPCS: Performed by: ORTHOPAEDIC SURGERY

## 2025-08-08 PROCEDURE — 2500000005 HC RX 250 GENERAL PHARMACY W/O HCPCS

## 2025-08-08 PROCEDURE — 2500000005 HC RX 250 GENERAL PHARMACY W/O HCPCS: Performed by: ANESTHESIOLOGY

## 2025-08-08 PROCEDURE — RXMED WILLOW AMBULATORY MEDICATION CHARGE

## 2025-08-08 PROCEDURE — C1776 JOINT DEVICE (IMPLANTABLE): HCPCS | Performed by: ORTHOPAEDIC SURGERY

## 2025-08-08 PROCEDURE — 2500000004 HC RX 250 GENERAL PHARMACY W/ HCPCS (ALT 636 FOR OP/ED)

## 2025-08-08 PROCEDURE — 3700000002 HC GENERAL ANESTHESIA TIME - EACH INCREMENTAL 1 MINUTE: Performed by: ORTHOPAEDIC SURGERY

## 2025-08-08 PROCEDURE — 2780000003 HC OR 278 NO HCPCS: Performed by: ORTHOPAEDIC SURGERY

## 2025-08-08 PROCEDURE — 2500000001 HC RX 250 WO HCPCS SELF ADMINISTERED DRUGS (ALT 637 FOR MEDICARE OP): Performed by: ORTHOPAEDIC SURGERY

## 2025-08-08 PROCEDURE — 7100000002 HC RECOVERY ROOM TIME - EACH INCREMENTAL 1 MINUTE: Performed by: ORTHOPAEDIC SURGERY

## 2025-08-08 PROCEDURE — 3700000001 HC GENERAL ANESTHESIA TIME - INITIAL BASE CHARGE: Performed by: ORTHOPAEDIC SURGERY

## 2025-08-08 PROCEDURE — 7100000001 HC RECOVERY ROOM TIME - INITIAL BASE CHARGE: Performed by: ORTHOPAEDIC SURGERY

## 2025-08-08 PROCEDURE — C1713 ANCHOR/SCREW BN/BN,TIS/BN: HCPCS | Performed by: ORTHOPAEDIC SURGERY

## 2025-08-08 PROCEDURE — 9420000001 HC RT PATIENT EDUCATION 5 MIN

## 2025-08-08 PROCEDURE — 97165 OT EVAL LOW COMPLEX 30 MIN: CPT | Mod: GO

## 2025-08-08 PROCEDURE — 2720000007 HC OR 272 NO HCPCS: Performed by: ORTHOPAEDIC SURGERY

## 2025-08-08 PROCEDURE — 73030 X-RAY EXAM OF SHOULDER: CPT | Mod: LT

## 2025-08-08 PROCEDURE — 73030 X-RAY EXAM OF SHOULDER: CPT | Mod: LEFT SIDE | Performed by: RADIOLOGY

## 2025-08-08 DEVICE — GLENOSPHERE, 36/24 BASEPLATE TAPER: Type: IMPLANTABLE DEVICE | Site: SHOULDER | Status: FUNCTIONAL

## 2025-08-08 DEVICE — IMPLANTABLE DEVICE: Type: IMPLANTABLE DEVICE | Site: SHOULDER | Status: FUNCTIONAL

## 2025-08-08 DEVICE — GLENOID PIN, TARGETER, 2.8MM, STAINLESS: Type: IMPLANTABLE DEVICE | Site: SHOULDER | Status: NON-FUNCTIONAL

## 2025-08-08 DEVICE — BASEPLATE, GLENIOD, MODULAR, 24MM +4 LAT: Type: IMPLANTABLE DEVICE | Site: SHOULDER | Status: FUNCTIONAL

## 2025-08-08 RX ORDER — OXYCODONE AND ACETAMINOPHEN 5; 325 MG/1; MG/1
1 TABLET ORAL EVERY 6 HOURS PRN
Qty: 25 TABLET | Refills: 0 | Status: SHIPPED | OUTPATIENT
Start: 2025-08-08 | End: 2025-08-16

## 2025-08-08 RX ORDER — MORPHINE SULFATE 2 MG/ML
2 INJECTION, SOLUTION INTRAMUSCULAR; INTRAVENOUS EVERY 4 HOURS PRN
Status: DISCONTINUED | OUTPATIENT
Start: 2025-08-08 | End: 2025-08-09 | Stop reason: HOSPADM

## 2025-08-08 RX ORDER — ONDANSETRON HYDROCHLORIDE 2 MG/ML
INJECTION, SOLUTION INTRAVENOUS AS NEEDED
Status: DISCONTINUED | OUTPATIENT
Start: 2025-08-08 | End: 2025-08-08

## 2025-08-08 RX ORDER — MIDAZOLAM HYDROCHLORIDE 1 MG/ML
2 INJECTION, SOLUTION INTRAMUSCULAR; INTRAVENOUS ONCE
Status: COMPLETED | OUTPATIENT
Start: 2025-08-08 | End: 2025-08-08

## 2025-08-08 RX ORDER — BUPIVACAINE HCL/EPINEPHRINE 0.5-1:200K
VIAL (ML) INJECTION
Status: COMPLETED | OUTPATIENT
Start: 2025-08-08 | End: 2025-08-08

## 2025-08-08 RX ORDER — SODIUM CHLORIDE, SODIUM LACTATE, POTASSIUM CHLORIDE, CALCIUM CHLORIDE 600; 310; 30; 20 MG/100ML; MG/100ML; MG/100ML; MG/100ML
100 INJECTION, SOLUTION INTRAVENOUS CONTINUOUS
Status: DISCONTINUED | OUTPATIENT
Start: 2025-08-08 | End: 2025-08-08 | Stop reason: HOSPADM

## 2025-08-08 RX ORDER — CEFAZOLIN SODIUM IN 0.9 % NACL 3 G/100 ML
3 INTRAVENOUS SOLUTION, PIGGYBACK (ML) INTRAVENOUS EVERY 8 HOURS
Status: COMPLETED | OUTPATIENT
Start: 2025-08-08 | End: 2025-08-09

## 2025-08-08 RX ORDER — MEPERIDINE HYDROCHLORIDE 25 MG/ML
12.5 INJECTION INTRAMUSCULAR; INTRAVENOUS; SUBCUTANEOUS EVERY 10 MIN PRN
Status: DISCONTINUED | OUTPATIENT
Start: 2025-08-08 | End: 2025-08-08 | Stop reason: HOSPADM

## 2025-08-08 RX ORDER — ONDANSETRON HYDROCHLORIDE 2 MG/ML
4 INJECTION, SOLUTION INTRAVENOUS EVERY 8 HOURS PRN
Status: DISCONTINUED | OUTPATIENT
Start: 2025-08-08 | End: 2025-08-09 | Stop reason: HOSPADM

## 2025-08-08 RX ORDER — CARISOPRODOL 350 MG/1
350 TABLET ORAL 3 TIMES DAILY
Status: DISCONTINUED | OUTPATIENT
Start: 2025-08-09 | End: 2025-08-09 | Stop reason: HOSPADM

## 2025-08-08 RX ORDER — HYDROCODONE BITARTRATE AND ACETAMINOPHEN 10; 325 MG/1; MG/1
1 TABLET ORAL EVERY 6 HOURS PRN
Refills: 0 | Status: DISCONTINUED | OUTPATIENT
Start: 2025-08-08 | End: 2025-08-09 | Stop reason: HOSPADM

## 2025-08-08 RX ORDER — LANOLIN ALCOHOL/MO/W.PET/CERES
1000 CREAM (GRAM) TOPICAL DAILY
Status: DISCONTINUED | OUTPATIENT
Start: 2025-08-09 | End: 2025-08-09 | Stop reason: HOSPADM

## 2025-08-08 RX ORDER — FONDAPARINUX SODIUM 2.5 MG/.5ML
2.5 INJECTION SUBCUTANEOUS EVERY 24 HOURS
Status: DISCONTINUED | OUTPATIENT
Start: 2025-08-09 | End: 2025-08-09 | Stop reason: HOSPADM

## 2025-08-08 RX ORDER — HYDROMORPHONE HYDROCHLORIDE 1 MG/ML
INJECTION, SOLUTION INTRAMUSCULAR; INTRAVENOUS; SUBCUTANEOUS AS NEEDED
Status: DISCONTINUED | OUTPATIENT
Start: 2025-08-08 | End: 2025-08-08

## 2025-08-08 RX ORDER — OXYCODONE AND ACETAMINOPHEN 5; 325 MG/1; MG/1
1 TABLET ORAL EVERY 6 HOURS PRN
Status: DISCONTINUED | OUTPATIENT
Start: 2025-08-08 | End: 2025-08-08 | Stop reason: SDUPTHER

## 2025-08-08 RX ORDER — PHENYLEPHRINE HCL IN 0.9% NACL 0.4MG/10ML
SYRINGE (ML) INTRAVENOUS AS NEEDED
Status: DISCONTINUED | OUTPATIENT
Start: 2025-08-08 | End: 2025-08-08

## 2025-08-08 RX ORDER — PHENYLEPHRINE 10 MG/250 ML(40 MCG/ML)IN 0.9 % SOD.CHLORIDE INTRAVENOUS
CONTINUOUS PRN
Status: DISCONTINUED | OUTPATIENT
Start: 2025-08-08 | End: 2025-08-08

## 2025-08-08 RX ORDER — MIDAZOLAM HYDROCHLORIDE 1 MG/ML
INJECTION, SOLUTION INTRAMUSCULAR; INTRAVENOUS CONTINUOUS PRN
Status: DISCONTINUED | OUTPATIENT
Start: 2025-08-08 | End: 2025-08-08

## 2025-08-08 RX ORDER — LABETALOL HYDROCHLORIDE 5 MG/ML
5 INJECTION, SOLUTION INTRAVENOUS ONCE AS NEEDED
Status: DISCONTINUED | OUTPATIENT
Start: 2025-08-08 | End: 2025-08-08 | Stop reason: HOSPADM

## 2025-08-08 RX ORDER — ACETAMINOPHEN 325 MG/1
650 TABLET ORAL EVERY 6 HOURS SCHEDULED
Status: DISCONTINUED | OUTPATIENT
Start: 2025-08-08 | End: 2025-08-09 | Stop reason: HOSPADM

## 2025-08-08 RX ORDER — LISINOPRIL 40 MG/1
40 TABLET ORAL NIGHTLY
Status: DISCONTINUED | OUTPATIENT
Start: 2025-08-08 | End: 2025-08-09 | Stop reason: HOSPADM

## 2025-08-08 RX ORDER — CYANOCOBALAMIN 1000 UG/ML
1000 INJECTION, SOLUTION INTRAMUSCULAR; SUBCUTANEOUS ONCE
Status: DISCONTINUED | OUTPATIENT
Start: 2025-08-08 | End: 2025-08-09 | Stop reason: HOSPADM

## 2025-08-08 RX ORDER — ONDANSETRON 4 MG/1
4 TABLET, ORALLY DISINTEGRATING ORAL EVERY 8 HOURS PRN
Status: DISCONTINUED | OUTPATIENT
Start: 2025-08-08 | End: 2025-08-09 | Stop reason: HOSPADM

## 2025-08-08 RX ORDER — OXYCODONE AND ACETAMINOPHEN 5; 325 MG/1; MG/1
1 TABLET ORAL EVERY 6 HOURS PRN
Status: DISCONTINUED | OUTPATIENT
Start: 2025-08-08 | End: 2025-08-09 | Stop reason: HOSPADM

## 2025-08-08 RX ORDER — LISINOPRIL 40 MG/1
40 TABLET ORAL DAILY
Status: DISCONTINUED | OUTPATIENT
Start: 2025-08-09 | End: 2025-08-08

## 2025-08-08 RX ORDER — FENTANYL CITRATE 50 UG/ML
INJECTION, SOLUTION INTRAMUSCULAR; INTRAVENOUS AS NEEDED
Status: DISCONTINUED | OUTPATIENT
Start: 2025-08-08 | End: 2025-08-08

## 2025-08-08 RX ORDER — VANCOMYCIN HYDROCHLORIDE 1 G/20ML
INJECTION, POWDER, LYOPHILIZED, FOR SOLUTION INTRAVENOUS AS NEEDED
Status: DISCONTINUED | OUTPATIENT
Start: 2025-08-08 | End: 2025-08-08 | Stop reason: HOSPADM

## 2025-08-08 RX ORDER — HYDRALAZINE HYDROCHLORIDE 20 MG/ML
5 INJECTION INTRAMUSCULAR; INTRAVENOUS EVERY 30 MIN PRN
Status: DISCONTINUED | OUTPATIENT
Start: 2025-08-08 | End: 2025-08-08 | Stop reason: HOSPADM

## 2025-08-08 RX ORDER — CEFAZOLIN 1 G/1
INJECTION, POWDER, FOR SOLUTION INTRAVENOUS AS NEEDED
Status: DISCONTINUED | OUTPATIENT
Start: 2025-08-08 | End: 2025-08-08

## 2025-08-08 RX ORDER — TRANEXAMIC ACID 1 G/10ML
INJECTION, SOLUTION INTRAVENOUS AS NEEDED
Status: DISCONTINUED | OUTPATIENT
Start: 2025-08-08 | End: 2025-08-08

## 2025-08-08 RX ORDER — ALBUTEROL SULFATE 0.83 MG/ML
2.5 SOLUTION RESPIRATORY (INHALATION) ONCE AS NEEDED
Status: DISCONTINUED | OUTPATIENT
Start: 2025-08-08 | End: 2025-08-08 | Stop reason: HOSPADM

## 2025-08-08 RX ORDER — KETOROLAC TROMETHAMINE 30 MG/ML
INJECTION, SOLUTION INTRAMUSCULAR; INTRAVENOUS AS NEEDED
Status: DISCONTINUED | OUTPATIENT
Start: 2025-08-08 | End: 2025-08-08

## 2025-08-08 RX ORDER — MORPHINE SULFATE 2 MG/ML
2 INJECTION, SOLUTION INTRAMUSCULAR; INTRAVENOUS EVERY 5 MIN PRN
Status: DISCONTINUED | OUTPATIENT
Start: 2025-08-08 | End: 2025-08-08 | Stop reason: HOSPADM

## 2025-08-08 RX ORDER — LIDOCAINE HYDROCHLORIDE 20 MG/ML
INJECTION, SOLUTION INFILTRATION; PERINEURAL AS NEEDED
Status: DISCONTINUED | OUTPATIENT
Start: 2025-08-08 | End: 2025-08-08

## 2025-08-08 RX ORDER — ONDANSETRON HYDROCHLORIDE 2 MG/ML
4 INJECTION, SOLUTION INTRAVENOUS ONCE AS NEEDED
Status: DISCONTINUED | OUTPATIENT
Start: 2025-08-08 | End: 2025-08-08 | Stop reason: HOSPADM

## 2025-08-08 RX ORDER — ROCURONIUM BROMIDE 10 MG/ML
INJECTION, SOLUTION INTRAVENOUS AS NEEDED
Status: DISCONTINUED | OUTPATIENT
Start: 2025-08-08 | End: 2025-08-08

## 2025-08-08 RX ORDER — FENTANYL CITRATE 50 UG/ML
50 INJECTION, SOLUTION INTRAMUSCULAR; INTRAVENOUS EVERY 5 MIN PRN
Status: DISCONTINUED | OUTPATIENT
Start: 2025-08-08 | End: 2025-08-08 | Stop reason: HOSPADM

## 2025-08-08 RX ORDER — PROPOFOL 10 MG/ML
INJECTION, EMULSION INTRAVENOUS AS NEEDED
Status: DISCONTINUED | OUTPATIENT
Start: 2025-08-08 | End: 2025-08-08

## 2025-08-08 RX ORDER — SODIUM CHLORIDE, SODIUM LACTATE, POTASSIUM CHLORIDE, CALCIUM CHLORIDE 600; 310; 30; 20 MG/100ML; MG/100ML; MG/100ML; MG/100ML
50 INJECTION, SOLUTION INTRAVENOUS CONTINUOUS
Status: DISCONTINUED | OUTPATIENT
Start: 2025-08-08 | End: 2025-08-09 | Stop reason: HOSPADM

## 2025-08-08 RX ORDER — CHLORHEXIDINE GLUCONATE ORAL RINSE 1.2 MG/ML
15 SOLUTION DENTAL 3 TIMES DAILY
Status: DISCONTINUED | OUTPATIENT
Start: 2025-08-08 | End: 2025-08-09 | Stop reason: HOSPADM

## 2025-08-08 RX ORDER — FENTANYL CITRATE 50 UG/ML
100 INJECTION, SOLUTION INTRAMUSCULAR; INTRAVENOUS ONCE
Status: COMPLETED | OUTPATIENT
Start: 2025-08-08 | End: 2025-08-08

## 2025-08-08 RX ADMIN — BUPIVACAINE HYDROCHLORIDE AND EPINEPHRINE BITARTRATE 20 ML: 5; .005 INJECTION, SOLUTION PERINEURAL at 07:59

## 2025-08-08 RX ADMIN — ONDANSETRON 4 MG: 2 INJECTION, SOLUTION INTRAMUSCULAR; INTRAVENOUS at 10:39

## 2025-08-08 RX ADMIN — FENTANYL CITRATE 50 MCG: 0.05 INJECTION, SOLUTION INTRAMUSCULAR; INTRAVENOUS at 08:16

## 2025-08-08 RX ADMIN — MORPHINE SULFATE 2 MG: 2 INJECTION, SOLUTION INTRAMUSCULAR; INTRAVENOUS at 21:09

## 2025-08-08 RX ADMIN — MIDAZOLAM HYDROCHLORIDE 2 MG: 1 INJECTION, SOLUTION INTRAMUSCULAR; INTRAVENOUS at 07:57

## 2025-08-08 RX ADMIN — Medication 300 MCG: at 08:34

## 2025-08-08 RX ADMIN — Medication 3 G: at 16:31

## 2025-08-08 RX ADMIN — SODIUM CHLORIDE, SODIUM LACTATE, POTASSIUM CHLORIDE, AND CALCIUM CHLORIDE 50 ML/HR: 600; 310; 30; 20 INJECTION, SOLUTION INTRAVENOUS at 13:05

## 2025-08-08 RX ADMIN — ACETAMINOPHEN 650 MG: 325 TABLET ORAL at 17:59

## 2025-08-08 RX ADMIN — Medication 0.15 MCG/KG/MIN: at 08:44

## 2025-08-08 RX ADMIN — ROCURONIUM BROMIDE 10 MG: 10 INJECTION, SOLUTION INTRAVENOUS at 09:22

## 2025-08-08 RX ADMIN — CEFAZOLIN 3 G: 1 INJECTION, POWDER, FOR SOLUTION INTRAMUSCULAR; INTRAVENOUS at 08:25

## 2025-08-08 RX ADMIN — Medication 200 MCG: at 10:36

## 2025-08-08 RX ADMIN — TRANEXAMIC ACID 1000 MG: 100 INJECTION, SOLUTION INTRAVENOUS at 08:37

## 2025-08-08 RX ADMIN — MORPHINE SULFATE 2 MG: 2 INJECTION, SOLUTION INTRAMUSCULAR; INTRAVENOUS at 16:31

## 2025-08-08 RX ADMIN — FENTANYL CITRATE 100 MCG: 50 INJECTION INTRAMUSCULAR; INTRAVENOUS at 07:57

## 2025-08-08 RX ADMIN — Medication 100 MCG: at 08:58

## 2025-08-08 RX ADMIN — SODIUM CHLORIDE, POTASSIUM CHLORIDE, SODIUM LACTATE AND CALCIUM CHLORIDE: 600; 310; 30; 20 INJECTION, SOLUTION INTRAVENOUS at 08:10

## 2025-08-08 RX ADMIN — DEXAMETHASONE SODIUM PHOSPHATE 10 MG: 4 INJECTION, SOLUTION INTRAMUSCULAR; INTRAVENOUS at 07:59

## 2025-08-08 RX ADMIN — PROPOFOL 200 MG: 10 INJECTION, EMULSION INTRAVENOUS at 08:16

## 2025-08-08 RX ADMIN — ROCURONIUM BROMIDE 60 MG: 10 INJECTION, SOLUTION INTRAVENOUS at 08:16

## 2025-08-08 RX ADMIN — Medication 300 MCG: at 10:26

## 2025-08-08 RX ADMIN — ACETAMINOPHEN 650 MG: 325 TABLET ORAL at 23:34

## 2025-08-08 RX ADMIN — Medication 200 MCG: at 09:30

## 2025-08-08 RX ADMIN — Medication 3 G: at 23:34

## 2025-08-08 RX ADMIN — POVIDONE-IODINE 1 APPLICATION: 5 SOLUTION TOPICAL at 06:40

## 2025-08-08 RX ADMIN — KETOROLAC TROMETHAMINE 15 MG: 30 INJECTION, SOLUTION INTRAMUSCULAR at 10:39

## 2025-08-08 RX ADMIN — LIDOCAINE HYDROCHLORIDE 100 MG: 20 INJECTION, SOLUTION INFILTRATION; PERINEURAL at 08:16

## 2025-08-08 RX ADMIN — HYDROMORPHONE HYDROCHLORIDE 0.4 MG: 1 INJECTION, SOLUTION INTRAMUSCULAR; INTRAVENOUS; SUBCUTANEOUS at 10:39

## 2025-08-08 RX ADMIN — Medication 10 ML: at 08:03

## 2025-08-08 SDOH — SOCIAL STABILITY: SOCIAL INSECURITY: HAS ANYONE EVER THREATENED TO HURT YOUR FAMILY OR YOUR PETS?: NO

## 2025-08-08 SDOH — ECONOMIC STABILITY: HOUSING INSECURITY: AT ANY TIME IN THE PAST 12 MONTHS, WERE YOU HOMELESS OR LIVING IN A SHELTER (INCLUDING NOW)?: NO

## 2025-08-08 SDOH — ECONOMIC STABILITY: HOUSING INSECURITY: IN THE LAST 12 MONTHS, WAS THERE A TIME WHEN YOU WERE NOT ABLE TO PAY THE MORTGAGE OR RENT ON TIME?: NO

## 2025-08-08 SDOH — SOCIAL STABILITY: SOCIAL INSECURITY: HAVE YOU HAD THOUGHTS OF HARMING ANYONE ELSE?: NO

## 2025-08-08 SDOH — SOCIAL STABILITY: SOCIAL INSECURITY: WITHIN THE LAST YEAR, HAVE YOU BEEN AFRAID OF YOUR PARTNER OR EX-PARTNER?: NO

## 2025-08-08 SDOH — SOCIAL STABILITY: SOCIAL INSECURITY
WITHIN THE LAST YEAR, HAVE YOU BEEN KICKED, HIT, SLAPPED, OR OTHERWISE PHYSICALLY HURT BY YOUR PARTNER OR EX-PARTNER?: NO

## 2025-08-08 SDOH — SOCIAL STABILITY: SOCIAL INSECURITY: DO YOU FEEL UNSAFE GOING BACK TO THE PLACE WHERE YOU ARE LIVING?: NO

## 2025-08-08 SDOH — ECONOMIC STABILITY: FOOD INSECURITY: WITHIN THE PAST 12 MONTHS, THE FOOD YOU BOUGHT JUST DIDN'T LAST AND YOU DIDN'T HAVE MONEY TO GET MORE.: NEVER TRUE

## 2025-08-08 SDOH — ECONOMIC STABILITY: FOOD INSECURITY: HOW HARD IS IT FOR YOU TO PAY FOR THE VERY BASICS LIKE FOOD, HOUSING, MEDICAL CARE, AND HEATING?: NOT HARD AT ALL

## 2025-08-08 SDOH — ECONOMIC STABILITY: INCOME INSECURITY: IN THE PAST 12 MONTHS HAS THE ELECTRIC, GAS, OIL, OR WATER COMPANY THREATENED TO SHUT OFF SERVICES IN YOUR HOME?: NO

## 2025-08-08 SDOH — SOCIAL STABILITY: SOCIAL INSECURITY: HAVE YOU HAD ANY THOUGHTS OF HARMING ANYONE ELSE?: NO

## 2025-08-08 SDOH — SOCIAL STABILITY: SOCIAL INSECURITY: WITHIN THE LAST YEAR, HAVE YOU BEEN HUMILIATED OR EMOTIONALLY ABUSED IN OTHER WAYS BY YOUR PARTNER OR EX-PARTNER?: NO

## 2025-08-08 SDOH — ECONOMIC STABILITY: FOOD INSECURITY: WITHIN THE PAST 12 MONTHS, YOU WORRIED THAT YOUR FOOD WOULD RUN OUT BEFORE YOU GOT THE MONEY TO BUY MORE.: NEVER TRUE

## 2025-08-08 SDOH — SOCIAL STABILITY: SOCIAL INSECURITY
WITHIN THE LAST YEAR, HAVE YOU BEEN RAPED OR FORCED TO HAVE ANY KIND OF SEXUAL ACTIVITY BY YOUR PARTNER OR EX-PARTNER?: NO

## 2025-08-08 SDOH — ECONOMIC STABILITY: TRANSPORTATION INSECURITY: IN THE PAST 12 MONTHS, HAS LACK OF TRANSPORTATION KEPT YOU FROM MEDICAL APPOINTMENTS OR FROM GETTING MEDICATIONS?: NO

## 2025-08-08 SDOH — ECONOMIC STABILITY: HOUSING INSECURITY: IN THE PAST 12 MONTHS, HOW MANY TIMES HAVE YOU MOVED WHERE YOU WERE LIVING?: 0

## 2025-08-08 SDOH — SOCIAL STABILITY: SOCIAL INSECURITY: ARE YOU OR HAVE YOU BEEN THREATENED OR ABUSED PHYSICALLY, EMOTIONALLY, OR SEXUALLY BY ANYONE?: NO

## 2025-08-08 SDOH — SOCIAL STABILITY: SOCIAL INSECURITY: WERE YOU ABLE TO COMPLETE ALL THE BEHAVIORAL HEALTH SCREENINGS?: YES

## 2025-08-08 SDOH — SOCIAL STABILITY: SOCIAL INSECURITY: DOES ANYONE TRY TO KEEP YOU FROM HAVING/CONTACTING OTHER FRIENDS OR DOING THINGS OUTSIDE YOUR HOME?: NO

## 2025-08-08 SDOH — SOCIAL STABILITY: SOCIAL INSECURITY: ABUSE: ADULT

## 2025-08-08 SDOH — SOCIAL STABILITY: SOCIAL INSECURITY: DO YOU FEEL ANYONE HAS EXPLOITED OR TAKEN ADVANTAGE OF YOU FINANCIALLY OR OF YOUR PERSONAL PROPERTY?: NO

## 2025-08-08 SDOH — SOCIAL STABILITY: SOCIAL INSECURITY: ARE THERE ANY APPARENT SIGNS OF INJURIES/BEHAVIORS THAT COULD BE RELATED TO ABUSE/NEGLECT?: NO

## 2025-08-08 SDOH — ECONOMIC STABILITY: HOUSING INSECURITY: DO YOU FEEL UNSAFE GOING BACK TO THE PLACE WHERE YOU LIVE?: NO

## 2025-08-08 SDOH — HEALTH STABILITY: MENTAL HEALTH: CURRENT SMOKER: 0

## 2025-08-08 ASSESSMENT — PAIN - FUNCTIONAL ASSESSMENT
PAIN_FUNCTIONAL_ASSESSMENT: 0-10

## 2025-08-08 ASSESSMENT — COGNITIVE AND FUNCTIONAL STATUS - GENERAL
WALKING IN HOSPITAL ROOM: A LITTLE
WALKING IN HOSPITAL ROOM: A LITTLE
MOVING TO AND FROM BED TO CHAIR: A LITTLE
STANDING UP FROM CHAIR USING ARMS: A LITTLE
STANDING UP FROM CHAIR USING ARMS: A LITTLE
MOBILITY SCORE: 15
PERSONAL GROOMING: A LITTLE
MOVING FROM LYING ON BACK TO SITTING ON SIDE OF FLAT BED WITH BEDRAILS: A LOT
MOBILITY SCORE: 18
HELP NEEDED FOR BATHING: A LOT
MOVING TO AND FROM BED TO CHAIR: A LITTLE
CLIMB 3 TO 5 STEPS WITH RAILING: A LITTLE
PERSONAL GROOMING: A LITTLE
TURNING FROM BACK TO SIDE WHILE IN FLAT BAD: A LOT
DRESSING REGULAR UPPER BODY CLOTHING: A LOT
DAILY ACTIVITIY SCORE: 14
TOILETING: A LITTLE
STANDING UP FROM CHAIR USING ARMS: A LITTLE
PERSONAL GROOMING: A LITTLE
DAILY ACTIVITIY SCORE: 18
TOILETING: A LITTLE
DRESSING REGULAR LOWER BODY CLOTHING: A LITTLE
DRESSING REGULAR LOWER BODY CLOTHING: A LOT
HELP NEEDED FOR BATHING: A LITTLE
MOVING FROM LYING ON BACK TO SITTING ON SIDE OF FLAT BED WITH BEDRAILS: A LITTLE
DRESSING REGULAR LOWER BODY CLOTHING: A LITTLE
DRESSING REGULAR UPPER BODY CLOTHING: A LITTLE
EATING MEALS: A LITTLE
TOILETING: A LOT
EATING MEALS: A LITTLE
TURNING FROM BACK TO SIDE WHILE IN FLAT BAD: A LITTLE
DRESSING REGULAR UPPER BODY CLOTHING: A LITTLE
MOVING TO AND FROM BED TO CHAIR: A LITTLE
PATIENT BASELINE BEDBOUND: NO
MOBILITY SCORE: 18
TURNING FROM BACK TO SIDE WHILE IN FLAT BAD: A LITTLE
CLIMB 3 TO 5 STEPS WITH RAILING: A LITTLE
CLIMB 3 TO 5 STEPS WITH RAILING: A LOT
MOVING FROM LYING ON BACK TO SITTING ON SIDE OF FLAT BED WITH BEDRAILS: A LITTLE
EATING MEALS: A LITTLE
HELP NEEDED FOR BATHING: A LITTLE
DAILY ACTIVITIY SCORE: 18
WALKING IN HOSPITAL ROOM: A LITTLE

## 2025-08-08 ASSESSMENT — ACTIVITIES OF DAILY LIVING (ADL)
ADL_ASSISTANCE: INDEPENDENT
HEARING - LEFT EAR: FUNCTIONAL
JUDGMENT_ADEQUATE_SAFELY_COMPLETE_DAILY_ACTIVITIES: YES
ADEQUATE_TO_COMPLETE_ADL: YES
FEEDING YOURSELF: INDEPENDENT
WALKS IN HOME: INDEPENDENT
BATHING: INDEPENDENT
LACK_OF_TRANSPORTATION: NO
ADL_ASSISTANCE: INDEPENDENT
HEARING - RIGHT EAR: FUNCTIONAL
TOILETING: INDEPENDENT
DRESSING YOURSELF: INDEPENDENT
LACK_OF_TRANSPORTATION: NO
PATIENT'S MEMORY ADEQUATE TO SAFELY COMPLETE DAILY ACTIVITIES?: YES
BATHING_ASSISTANCE: MODERATE
GROOMING: INDEPENDENT

## 2025-08-08 ASSESSMENT — PAIN SCALES - GENERAL
PAINLEVEL_OUTOF10: 0 - NO PAIN
PAINLEVEL_OUTOF10: 0 - NO PAIN
PAINLEVEL_OUTOF10: 3
PAIN_LEVEL: 0
PAINLEVEL_OUTOF10: 3
PAINLEVEL_OUTOF10: 8
PAINLEVEL_OUTOF10: 0 - NO PAIN
PAINLEVEL_OUTOF10: 7
PAINLEVEL_OUTOF10: 3
PAINLEVEL_OUTOF10: 7
PAINLEVEL_OUTOF10: 3
PAINLEVEL_OUTOF10: 0 - NO PAIN
PAINLEVEL_OUTOF10: 1

## 2025-08-08 ASSESSMENT — COLUMBIA-SUICIDE SEVERITY RATING SCALE - C-SSRS
2. HAVE YOU ACTUALLY HAD ANY THOUGHTS OF KILLING YOURSELF?: NO
6. HAVE YOU EVER DONE ANYTHING, STARTED TO DO ANYTHING, OR PREPARED TO DO ANYTHING TO END YOUR LIFE?: NO
1. IN THE PAST MONTH, HAVE YOU WISHED YOU WERE DEAD OR WISHED YOU COULD GO TO SLEEP AND NOT WAKE UP?: NO

## 2025-08-08 ASSESSMENT — LIFESTYLE VARIABLES
HOW OFTEN DO YOU HAVE A DRINK CONTAINING ALCOHOL: 2-3 TIMES A WEEK
SKIP TO QUESTIONS 9-10: 1
AUDIT-C TOTAL SCORE: 3
HOW MANY STANDARD DRINKS CONTAINING ALCOHOL DO YOU HAVE ON A TYPICAL DAY: 1 OR 2
AUDIT-C TOTAL SCORE: 3
HOW OFTEN DO YOU HAVE 6 OR MORE DRINKS ON ONE OCCASION: NEVER

## 2025-08-08 ASSESSMENT — PATIENT HEALTH QUESTIONNAIRE - PHQ9
SUM OF ALL RESPONSES TO PHQ9 QUESTIONS 1 & 2: 0
1. LITTLE INTEREST OR PLEASURE IN DOING THINGS: NOT AT ALL
2. FEELING DOWN, DEPRESSED OR HOPELESS: NOT AT ALL

## 2025-08-08 ASSESSMENT — PAIN DESCRIPTION - LOCATION
LOCATION: SHOULDER
LOCATION: SHOULDER

## 2025-08-08 ASSESSMENT — PAIN DESCRIPTION - ORIENTATION
ORIENTATION: LEFT
ORIENTATION: LEFT

## 2025-08-08 ASSESSMENT — PAIN DESCRIPTION - DESCRIPTORS: DESCRIPTORS: ACHING

## 2025-08-08 NOTE — ANESTHESIA PROCEDURE NOTES
Airway  Date/Time: 8/8/2025 8:19 AM  Reason: elective    Airway not difficult    Staffing  Performed: attending   Authorized by: Jeanmarie Hitchcock MD    Performed by: MAEGAN Clay-EDWINA  Patient location during procedure: OR    Patient Condition  Indications for airway management: anesthesia  Patient position: sniffing  Sedation level: deep     Final Airway Details   Preoxygenated: yes  Final airway type: endotracheal airway  Successful airway: ETT   Successful intubation technique: video laryngoscopy  Endotracheal tube insertion site: oral  Blade: Branden  Blade size: #3  ETT size (mm): 7.5  Cormack-Lehane Classification: grade I - full view of glottis  Placement verified by: chest auscultation and capnometry   Measured from: lips  Number of attempts at approach: 1    Additional Comments  Performed by paramedic student

## 2025-08-08 NOTE — OP NOTE
Total Shoulder Reverse Arthroplasty (L) Operative Note     Date: 2025  OR Location: [facility] OR    Name: Abhinav Rangel, : 1955, Age: 69 y.o., MRN: [id number], Sex: male    Diagnosis  Pre-op Diagnosis      * Nontraumatic complete tear of rotator cuff, left [M75.122] Post-op Diagnosis     * Nontraumatic complete tear of rotator cuff, left [M75.122]     Procedures  Total Shoulder Reverse Arthroplasty  59338 - DE ARTHROPLASTY GLENOHUMERAL JOINT TOTAL SHOULDER      Surgeons      * Jose Calvert - Primary    Resident/Fellow/Other Assistant:  Surgeons and Role:  * No surgeons found with a matching role *    Staff:   Circulator: Geoffrey Gonzalez Person: Edgard  Surgical Assistant: Ade  Surgical Assistant: Ana Maria    Anesthesia Staff: Anesthesiologist: Jeanmarie Hitchcock MD  CRNA: MAEGAN Clay-CRNA  C-AA: NOEMI Viera    Procedure Summary  Anesthesia: Anesthesia type not filed in the log.  ASA: III  Estimated Blood Loss: 100 mL  Intra-op Medications:   Administrations occurring from 0745 to 1035 on 25:   Medication Name Total Dose   vancomycin (Vancocin) vial for injection 1 g   ceFAZolin (Ancef) vial 1 g 3 g   fentaNYL (Sublimaze) injection 50 mcg/mL 50 mcg   LR bolus Cannot be calculated   lidocaine (Xylocaine) injection 2 % 100 mg   phenylephrine (Chad-Synephrine) 10 mg/250 mL NS (40 mcg/mL) infusion 4.07 mg   phenylephrine 40 mcg/mL syringe 10 mL 600 mcg   propofol (Diprivan) injection 10 mg/mL 200 mg   rocuronium (ZeMuron) 50 mg/5 mL injection 70 mg   tranexamic acid injection 100 mg/mL 1,000 mg   bupivacaine 0.5%-EPINEPHrine (Marcaine w/ EPI) injection 20 mL   bupivacaine 0.5%-EPINEPHrine (Marcaine w/ EPI) injection 10 mL   dexAMETHasone (Decadron) 4 mg/mL IV Syringe 2 mL 10 mg   fentaNYL PF (Sublimaze) injection 100 mcg 100 mcg   midazolam (Versed) injection 2 mg 2 mg              Anesthesia Record               Intraprocedure I/O Totals          Intake    Tranexamic Acid 0.00 mL    The  total shown is the total volume documented since Anesthesia Start was filed.    Phenylephrine Drip 0.00 mL    The total shown is the total volume documented since Anesthesia Start was filed.    Total Intake 0 mL       Output    Est. Blood Loss 100 mL    Total Output 100 mL       Net    Net Volume -100 mL          Specimen: No specimens collected              Drains and/or Catheters: * None in log *    Tourniquet Times:         Implants:  Implants       Type Name Action Serial No.      Joint GLENOID PIN, TARGETER, 2.8MM, STAINLESS - [id number] Used, Not Implanted      Joint SUTURE CUP, UNIVER REVERS, 36 +2 LEFT - [id number] Implanted      Joint STEM, UNIVERS REVERS APEX, SZ 9 - [id number] Implanted      Joint SCREW, MODULAR CENTRAL, 30MM - [id number] Implanted      Joint BASEPLATE, GLENIOD, MODULAR, 24MM +4 LAT - [id number] Implanted      Screw SCREW, LOCKING, 5.5MM X 36MM - [id number] Implanted      Screw SCREW, LOCKING, 5.5MM X 32MM - [id number] Implanted      Joint GLENOSPHERE, 36/24 BASEPLATE TAPER - [id number] Implanted               Findings: Complete tear supraspinatus tendon. attenuation subscapularis tendon    Indications: Abhinav Rangel is an 69 y.o. male who is having surgery for SHOULDER PAIN.  Plan is to proceed with left reverse total shoulder arthroplasty.  Risks of surgery were explained the patient including postop pain, infection, stiffness, neurovascular injury, need for additional surgery treatment.  They understand the risks and is willing to proceed.    The patient was seen in the preoperative area. The risks, benefits, complications, treatment options, non-operative alternatives, expected recovery and outcomes were discussed with the patient. The possibilities of reaction to medication, pulmonary aspiration, injury to surrounding structures, bleeding, recurrent infection, the need for additional procedures, failure to diagnose a condition, and creating a complication requiring transfusion or  operation were discussed with the patient. The patient concurred with the proposed plan, giving informed consent.  The site of surgery was properly noted/marked if necessary per policy. The patient has been actively warmed in preoperative area. Preoperative antibiotics have been ordered and given within 1 hours of incision. Venous thrombosis prophylaxis have been ordered including bilateral sequential compression devices    Procedure Details:   Patient was taken the op room and administered a preoperative block and general anesthesia.  He is placed in the beachchair position.  He was then prepped and draped in the usual sterile fashion.  Bony landmarks are identified and marked.  A standard deltopectoral incision was made starting the coracoid process extending over the upper arm.  Subtenons tissues dissected with the Bovie.  Flaps were developed.  The cephalic vein was identified and carefully retracted laterally with the deltoid muscle.  The deltoid is mobilized around the humeral head.  Clavipectoral fascia was incised.  The axillary nerve was identified and protected throughout the case.  A Brown retractor was inserted.  The humeral head was delivered into the field.  There was no biceps in the bicipital groove.  The rotator interval was opened and a subscapularis peel was performed.  Stay sutures were inserted.  The inferior capsule was released.  Findings demonstrated full tear of the supra supraspinatus tendon with retraction.  The humeral head was delivered into the field.  Anatomic head cut was performed.  The upper end of the humerus was reamed with 5 and 6 mm reamers.  It was then serially broached up to a size 9 apex stem using version rods to maintain 30 degrees of retroversion.  Endcap was inserted.  Attention was directed towards the preparation of the glenoid.  The subscapularis was released superiorly medially and inferior with my finger over the axillary nerve.  The inferior capsule was released  with my finger over the axillary nerve.  The shoulder was quite tight.  The central portion of the glenoid was marked.  Based on preoperative planning using the Arthrex VIP system decision was made to use a MGS baseplate with a central screw.  The guidewire was then inserted with a drill guide.  Based on preoperative planning and direct measurement decision was made to use a size 30 screw.  Central reaming was performed.  The central hole was drilled.  The hole was tapped to a size 30.  A size 24+4 modular glenoid system baseplate with a central screw was then inserted with excellent purchase.  Inferior and superior locking screws were inserted.  The peripheral portion of the glenoid was reamed by hand.  A size 36+0 glenosphere was impacted down.  Central screw was inserted.  Attention directed toward the humerus.  The upper end of the humerus was then reamed with posterior offset.  The broach was removed.  Drill holes were inserted.  The 9 apex stem with a 36 mm cup was then assembled with sutures and the rim and the stem of the cup.  The sutures from the stem of the cup were then passed through the drill holes with a suture passer.  The prosthesis was impacted down and appeared to fit well.  Several trials were 4 performed for the insert.  Decision was made to use a size 6 mm insert.  This was impacted down and the shoulder was reduced.  This was difficult impingement with glenohumeral rotation.  There is no instability with abduction and rotation.  Extensor graceful with performed.  Attention was directed towards the subscapularis repair.  The sutures through the rim of the cup were then passed through the subscapularis equally spaced with a scorpion.  They are then tied down per Arthrex protocol.  A suture was placed in the rotator interval with a #2 FiberWire suture.  Extensor rogation performed.  Vancomycin powder was inserted.  The deltopectoral interval was closed with 0.0 Vicryl.  Subcu sutures with 3.0  Vicryl.  Skin was closed with 4 Monocryl.  Steri-Strips were applied.  Placement was placed in a sterile silver dressing and a DonJoy sling.    Evidence of Infection: No   Complications:  None; patient tolerated the procedure well.    Disposition: PACU - hemodynamically stable.  Condition: stable         Task Performed by RNFA or Surgical Assistant:  An assistant was used throughout the case and assisted in retraction, visualization, and improved the flow of the case.          Additional Details: None    Attending Attestation: I was present and scrubbed for the entire procedure.    Jose Calvert  Phone Number: [phone]

## 2025-08-08 NOTE — PROGRESS NOTES
Physical Therapy    Physical Therapy Evaluation    Patient Name: Abhinav Rangel  MRN: [id number]  Department: [address]  Room: [address] [place] [address]  Today's Date: 8/8/2025   Time Calculation  Start Time: 1521  Stop Time: 1550  Time Calculation (min): 29 min    Assessment/Plan   PT Assessment  PT Assessment Results: Impaired balance, Decreased mobility, Impaired vision, Impaired hearing, Impaired sensation, Obesity, Decreased skin integrity, Orthopedic restrictions, Pain  Rehab Prognosis: Good  Barriers to Discharge Home: No anticipated barriers  Evaluation/Treatment Tolerance: Other (Comment) (slightly dizzy w/ mobility post op)  Medical Staff Made Aware: Yes  Strengths: Ability to acquire knowledge, Premorbid level of function, Rehab experience  Barriers to Participation: Comorbidities  End of Session Communication: Bedside nurse  Assessment Comment: Pt movin gw/ min assist post op, activity limited by slight dizziness w/ standing up. Pt should progress well, recommend continued PT services to progress post op mobility for amb at household distance and stair negotiation for return home.  End of Session Patient Position: Bed, 3 rail up, Alarm on  IP OR SWING BED PT PLAN  Inpatient or Swing Bed: Inpatient  PT Plan  Treatment/Interventions: Bed mobility, Transfer training, Gait training, Stair training, Balance training, Endurance training, Therapeutic activity  PT Plan: Ongoing PT  PT Frequency: Daily  PT Discharge Recommendations: Low intensity level of continued care  Equipment Recommended upon Discharge: Straight cane  PT Recommended Transfer Status: Contact guard, Assistive device  PT - OK to Discharge: Yes    Subjective     PT Visit Info:  PT Received On: 08/08/25  General Visit Information:  General  Reason for Referral: recent surgery s/p Total Shoulder Reverse Arthroplasty Lt  Referred By: Dr Calvert  Past Medical History Relevant to Rehab: bilat RC repairs, chronic neck pain, c-spine sx x 2, T-L spine surgeries x 7, HTN,  anyloidosis, depression, neuropathy, spinal stenosis, bilat TKA, foot sx  Family/Caregiver Present:  (sister present at beginning of session only)  Co-Treatment: OT  Co-Treatment Reason: pt safety and tolerance POD 0  Prior to Session Communication: Bedside nurse  Patient Position Received: Bed, 4 rail up, Alarm on  Preferred Learning Style: verbal, visual  General Comment: Pt is a 69 y.o. male adm for elective Lt shoulder reverse TSA w/ Dr Calvert on 8/8/25. Pt was cleared for PT eval and agreeable.  Home Living:  Home Living  Type of Home: House  Lives With: Spouse  Home Adaptive Equipment: Cane  Home Layout: Multi-level  Home Access: Stairs to enter with rails  Entrance Stairs-Rails: Both  Entrance Stairs-Number of Steps: 4  Bathroom Shower/Tub: Walk-in shower  Bathroom Toilet: Handicapped height  Bathroom Equipment: Grab bars in shower, Built-in shower seat  Home Living Comments: has 2 Huskies, spouse able to assist  Prior Level of Function:  Prior Function Per Pt/Caregiver Report  Level of [place]: Independent with ADLs and functional transfers, Independent with homemaking with ambulation  Receives Help From: Family  ADL Assistance: Independent  Homemaking Assistance: Independent  Ambulatory Assistance: Independent (occasional cane)  Hand Dominance: Right  Prior Function Comments: Pt sleeps in a recliner, drives, denies falls  Precautions:  Precautions  Hearing/Visual Limitations: slightly [place], glasses for distance  UE Weight Bearing Status: Left Non-Weight Bearing  Medical Precautions: Fall precautions  Post-Surgical Precautions: Left shoulder precautions  Precautions Comment: No active movement of operative shoulder. Patient to remain in sling at all times unless with therapy. Patient can come out of the sling for active range of motion of elbow, wrist, hand and fingers with therapy only. NWB to operative extremity. For Reverse Total Shoulder: Passive forward flexion, no rotation      Date/Time Vitals  Session Patient Position Pulse Resp SpO2 BP MAP (mmHg)    08/08/25 1557 --  --  114  19  96 %  114/71  85            Objective   Pain:  Pain Assessment  Pain Assessment: 0-10  0-10 (Numeric) Pain Score: 3  Pain Type: Surgical pain  Pain Location: Shoulder  Pain Orientation: Left  Pain Interventions: Repositioned  Response to Interventions: No change in pain, Provider notified  Cognition:  Cognition  Overall Cognitive Status: Within Functional Limits  Orientation Level: Oriented X4  Cognition Comments: pleasant, cooperative    General Assessments:     Activity Tolerance  Endurance: Decreased tolerance for upright activites  Activity Tolerance Comments: mild dizziness w/ standing    Sensation  Sensation Comment: neuropathy in hands, feet, nerve block still working fairly well at Lt shoulder    Strength  Strength Comments: BLEs 3/5 or >  Coordination  Coordination Comment: limited by LUE in sling     Dynamic Standing Balance  Dynamic Standing-Balance Support: Right upper extremity supported  Dynamic Standing-Level of Assistance: Contact guard  Dynamic Standing-Balance:  (standing at bedside)  Dynamic Standing-Comments: pt using cane Rt hand, c/o dizziness initially, tolerated ~ 2 minutes standing at bedside, weightshifting , taking steps in place.  Functional Assessments:  Bed Mobility  Bed Mobility: Yes  Bed Mobility 1  Bed Mobility 1: Supine to sitting  Level of Assistance 1: Minimum assistance  Bed Mobility Comments 1: to Rt EOB, HOB elevated, assist for trunk up. Required effort.  Bed Mobility 2  Bed Mobility  2: Sitting to supine  Level of Assistance 2: Minimum assistance  Bed Mobility Comments 2: assist for LEs onto bed    Transfers  Transfer: Yes  Transfer 1  Technique 1: Sit to stand, Stand to sit  Transfer Device 1: Cane  Transfer Level of Assistance 1: Contact guard, Minimum assistance  Trials/Comments 1: from/to EOB; pt c/o dizziness initially, tolerated ~ 2 minutes standing at bedside, weightshifting ,  taking steps in place.    Ambulation/Gait Training  Ambulation/Gait Training Performed:  (Pt a little dizzy w/ up on feet. Able to weightshift and take steps in place, tolerated ~ 2 minutes on feet)  Extremity/Trunk Assessments:  RLE   RLE : Within Functional Limits  LLE   LLE : Within Functional Limits  Outcome Measures:  [facility] Basic Mobility  Turning from your back to your side while in a flat bed without using bedrails: A lot  Moving from lying on your back to sitting on the side of a flat bed without using bedrails: A lot  Moving to and from bed to chair (including a wheelchair): A little  Standing up from a chair using your arms (e.g. wheelchair or bedside chair): A little  To walk in hospital room: A little  Climbing 3-5 steps with railing: A lot  Basic Mobility - Total Score: 15    Encounter Problems       Encounter Problems (Active)       Mobility       STG - Patient will ambulate 75' w/ cane and distant supervision  (Progressing)       Start:  08/08/25    Expected End:  08/10/25            STG - Patient will ascend and descend four to six stairs w/ Rt rail and SBA (Progressing)       Start:  08/08/25    Expected End:  08/10/25               PT Transfers       STG - Patient will transfer sit to and from stand MOD I (Progressing)       Start:  08/08/25    Expected End:  08/10/25               Safety       LTG - Patient will adhere to post op shoulder precautions during functional mobility (Progressing)       Start:  08/08/25    Expected End:  08/10/25                   Education Documentation  Precautions, taught by Tina Shrestha, PT at 8/8/2025  5:30 PM.  Learner: Patient  Readiness: Acceptance  Method: Explanation, Demonstration  Response: Verbalizes Understanding    Mobility Training, taught by Tina Shrestha, PT at 8/8/2025  5:30 PM.  Learner: Patient  Readiness: Acceptance  Method: Explanation, Demonstration  Response: Verbalizes Understanding    Education Comments  No comments found.

## 2025-08-08 NOTE — ASSESSMENT & PLAN NOTE
Low back pain  Spinal stenosis  Amyloidosis  Hypertension  Depression  Plan: Continue current medication.  Supportive care.  Physical therapy Occupational Therapy.  Monitor CBC and BMP.  Control pain.  Monitor blood pressure.  We will take DVT, fall, aspiration, decubitus, and DVT precaution.  EKG is reviewed and ordered.

## 2025-08-08 NOTE — ANESTHESIA PREPROCEDURE EVALUATION
Patient: Abhinav Rangel    Procedure Information       Date/Time: 08/08/25 0745    Procedure: Total Shoulder Reverse Arthroplasty (Left: Shoulder) - ARTHREX VIP SYSTEM    Location: [facility] / [facility]    Surgeons: Jose Calvert MD            Relevant Problems   No relevant active problems       Clinical information reviewed:   Tobacco  Allergies  Meds   Med Hx  Surg Hx   Fam Hx  Soc Hx        NPO Detail:  NPO/Void Status  Date of Last Liquid: 08/07/25  Time of Last Liquid: 2100  Date of Last Solid: 08/07/25  Time of Last Solid: 2100  Time of Last Void: 0615         Physical Exam    Airway  Mallampati: III  TM distance: >3 FB  Neck ROM: full  Mouth opening: 3 or more finger widths     Cardiovascular Comments: deferred   Dental    Pulmonary Comments: deferred   Abdominal   Comments: deferred           Anesthesia Plan    History of general anesthesia?: yes  History of complications of general anesthesia?: no    ASA 3     general and regional   (BPB and ISB)  The patient is not a current smoker.  Education provided regarding risk of obstructive sleep apnea.  intravenous induction   Postoperative pain plan includes opioids.  Anesthetic plan and risks discussed with patient.    Plan discussed with CRNA.

## 2025-08-08 NOTE — ANESTHESIA POSTPROCEDURE EVALUATION
Patient: Abhinav Rangel    Procedure Summary       Date: 08/08/25 Room / Location: [facility] / [facility]    Anesthesia Start: 0810 Anesthesia Stop: 1055    Procedure: Total Shoulder Reverse Arthroplasty (Left: Shoulder) Diagnosis:       Nontraumatic complete tear of rotator cuff, left      (SHOULDER PAIN)    Surgeons: Jose Calvert MD Responsible Provider: Jeanmarie Hitchcock MD    Anesthesia Type: general, regional ASA Status: 3            Anesthesia Type: general, regional    Vitals Value Taken Time   /84 08/08/25 11:40   Temp 36.3 °C (97.3 °F) 08/08/25 10:51   Pulse 83 08/08/25 11:44   Resp 20 08/08/25 11:44   SpO2 92 % 08/08/25 11:44   Vitals shown include unfiled device data.    Anesthesia Post Evaluation    Patient location during evaluation: PACU  Patient participation: complete - patient participated  Level of consciousness: awake and alert  Pain score: 0  Pain management: adequate  Multimodal analgesia pain management approach  Airway patency: patent  Two or more strategies used to mitigate risk of obstructive sleep apnea  Cardiovascular status: acceptable and blood pressure returned to baseline  Respiratory status: acceptable  Hydration status: acceptable  Postoperative Nausea and Vomiting: none        There were no known notable events for this encounter.

## 2025-08-08 NOTE — ANESTHESIA PROCEDURE NOTES
Peripheral Block    Patient location during procedure: pre-op  Medication administered at: 8/8/2025 7:59 AM  End time: 8/8/2025 8:01 AM  Reason for block: at surgeon's request and post-op pain management  Staffing  Performed: attending   Authorized by: Jeanmarie Hitchcock MD    Performed by: Jeanmarie Hitchcock MD  Preanesthetic Checklist  Completed: patient identified, IV checked, site marked, risks and benefits discussed, surgical consent, monitors and equipment checked, pre-op evaluation and timeout performed   Timeout performed at: 8/8/2025 7:56 AM  Peripheral Block  Patient position: laying flat  Prep: alcohol swabs  Patient monitoring: heart rate, cardiac monitor and continuous pulse ox  Block type: interscalene  Laterality: left  Injection technique: single-shot  Guidance: nerve stimulator and ultrasound guided  Needle  Needle type: short-bevel   Needle gauge: 22 G  Needle length: 5 cm  Needle localization: anatomical landmarks, ultrasound guidance and nerve stimulator  Needle insertion depth: 4 cm  Assessment  Injection assessment: negative aspiration for heme, no paresthesia on injection, incremental injection and local visualized surrounding nerve on ultrasound  Paresthesia pain: none  Heart rate change: no  Slow fractionated injection: yes  Medications Administered  dexAMETHasone (Decadron) injection - perineural injection   10 mg - 8/8/2025 7:59:00 AM  bupivacaine-EPINEPHrine (MARCAINE w/EPI) 0.5% -[id number] Perineural - perineural injection   20 mL - 8/8/2025 7:59:00 AM

## 2025-08-08 NOTE — ANESTHESIA PROCEDURE NOTES
Peripheral Block    Patient location during procedure: pre-op  Medication administered at: 8/8/2025 8:03 AM  End time: 8/8/2025 8:04 AM  Reason for block: at surgeon's request and post-op pain management  Staffing  Performed: attending   Authorized by: Jeanmarie Hitchcock MD    Performed by: Jeanmarie Hitchcock MD  Preanesthetic Checklist  Completed: patient identified, IV checked, site marked, risks and benefits discussed, surgical consent, monitors and equipment checked, pre-op evaluation and timeout performed   Timeout performed at: 8/8/2025 7:56 AM  Peripheral Block  Patient position: laying flat  Prep: alcohol swabs  Patient monitoring: heart rate, cardiac monitor and continuous pulse ox  Block type: other (intercostobrachial)  Laterality: left  Injection technique: single-shot  Needle  Needle type: short-bevel   Needle gauge: 22 G  Needle length: 5 cm  Needle localization: ultrasound guidance  Assessment  Injection assessment: negative aspiration for heme, no paresthesia on injection and incremental injection  Paresthesia pain: none  Heart rate change: no  Slow fractionated injection: yes  Additional Notes  This was an intercostobrachial plexus block  Medications Administered  bupivacaine-EPINEPHrine (MARCAINE w/EPI) 0.5% -[id number] Perineural - perineural injection   10 mL - 8/8/2025 8:03:00 AM

## 2025-08-08 NOTE — PROGRESS NOTES
Occupational Therapy    Evaluation    Patient Name: Abhinav Rangel  MRN: [id number]  Department: [address]  Room: [address] [place] [address]  Today's Date: 8/8/2025  Time Calculation  Start Time: 1525  Stop Time: 1551  Time Calculation (min): 26 min    Assessment  IP OT Assessment  OT Assessment: Pt is 68 y/o male admitted for Lt TSR. Pt presents w/ decreased ADL skills/ functional mobility, decreased ROM, decreased activity tolerance and surgical limitations. REcommend OT services toa ddress above deficits.  Prognosis: Good  Barriers to Discharge Home: No anticipated barriers  Evaluation/Treatment Tolerance: Patient tolerated treatment well  End of Session Communication: Bedside nurse  End of Session Patient Position: Bed, 3 rail up, Alarm on  Plan:  Treatment Interventions: ADL retraining, Functional transfer training, Endurance training, Patient/family training, Compensatory technique education  OT Frequency: 4 times per week  OT Discharge Recommendations: Low intensity level of continued care  Equipment Recommended upon Discharge: Straight cane  OT Recommended Transfer Status: Minimal assist, Assist of 1, Assistive equipment (Comment)  OT - OK to Discharge: Yes    Subjective   Current Problem:  1. Arthritis of left shoulder region  oxyCODONE-acetaminophen (Percocet) 5-325 mg tablet        OT Visit Info:  OT Received On: 08/08/25  General Visit Info:  General  Reason for Referral: REcent sx-Lt TSR  Referred By: Dr. Calvert  Family/Caregiver Present: No  Co-Treatment: PT  Co-Treatment Reason: for safety in mobility  Prior to Session Communication: Bedside nurse  Patient Position Received: Bed, 4 rail up, Alarm on  Preferred Learning Style: verbal, visual  General Comment: Cleared to see for eval, pt agreeable to therapy  Precautions:  Hearing/Visual Limitations: mild [place]  UE Weight Bearing Status: Left Non-Weight Bearing (Lt TSR)  Medical Precautions: Fall precautions  Post-Surgical Precautions: Left shoulder precautions  Braces  Applied: Sling to Lt arm  Precautions Comment: NWB Lt, sling in place at all times unless w/ therapy     Date/Time Vitals Session Patient Position Pulse Resp SpO2 BP MAP (mmHg)    08/08/25 1557 --  --  114  19  96 %  114/71  85           Pain:  Pain Assessment  Pain Assessment: 0-10  0-10 (Numeric) Pain Score: 3  Pain Type: Surgical pain  Pain Location: Shoulder  Pain Orientation: Left  Pain Interventions: Repositioned, Cold applied  Response to Interventions: No change in pain    Objective   Cognition:  Overall Cognitive Status: Within Functional Limits  Orientation Level: Oriented X4  Cognition Comments: Pt pleasant, cooperative, follows commands appropriately           Home Living:  Type of Home: House  Lives With: Spouse  Home Adaptive Equipment: Cane  Home Layout: Multi-level, Able to live on main level with bedroom/bathroom  Home Access: Stairs to enter with rails  Entrance Stairs-Rails: Both  Entrance Stairs-Number of Steps: 4  Bathroom Shower/Tub: Walk-in shower  Bathroom Toilet: Handicapped height  Bathroom Equipment: Grab bars in shower, Built-in shower seat  Home Living Comments: Sleeps in recliner, pt drives   Prior Function:  Level of [place]: Independent with ADLs and functional transfers, Independent with homemaking with ambulation  Receives Help From: Family (spouse)  ADL Assistance: Independent  Homemaking Assistance: Independent  Ambulatory Assistance: Independent  Hand Dominance: Right  IADL History:  IADL Comments: Shares w/ spouse  ADL:  Eating Assistance: Stand by  Eating Deficit: Setup (per clinical judgement)  Grooming Assistance: Stand by  Grooming Deficit: Setup (per clinical judgement)  Bathing Assistance: Moderate  Bathing Deficit:  (per clinical judgement)  UE Dressing Assistance: Moderate  UE Dressing Deficit:  (per clinical judgement)  LE Dressing Assistance: Moderate  LE Dressing Deficit:  (per clinical judgement)  Toileting Assistance with Device: Not performed  Functional  Assistance: Not performed  Activity Tolerance:  Endurance: Decreased tolerance for upright activites  Bed Mobility/Transfers: Bed Mobility  Bed Mobility: Yes  Bed Mobility 1  Bed Mobility 1: Supine to sitting  Level of Assistance 1: Minimum assistance  Bed Mobility Comments 1: Assist for trunk up    Transfers  Transfer: Yes  Transfer 1  Transfer From 1: Bed to  Transfer to 1: Stand  Technique 1: Sit to stand, Stand to sit  Transfer Device 1: Cane  Transfer Level of Assistance 1: Minimum assistance  Trials/Comments 1: Verbal cues for hand placement      Functional Mobility:  Functional Mobility  Functional Mobility Performed: Yes  Functional Mobility 1  Surface 1: Level tile  Device 1: Single point cane  Assistance 1: Minimum assistance  Comments 1: 3-4 sidesteps to HOB w/ min. assist and  cane.  Modalities:     IADL's:   IADL Comments: Shares w/ spouse  Vision: Vision - Basic Assessment  Current Vision: No visual deficits  Sensation:  Sensation Comment: Pt w/ reports of numbness in Lt hand  Strength:  Strength Comments: RUE 4/5, LUE no tested  Perception:     Coordination:      Hand Function:  Hand Function  Gross Grasp: Functional  Coordination: Functional  Extremities: RUE   RUE : Within Functional Limits and LUE   LUE:  (impaired Rt shld , WFL rest)      Outcome Measures: [facility] Daily Activity  Putting on and taking off regular lower body clothing: A lot  Bathing (including washing, rinsing, drying): A lot  Putting on and taking off regular upper body clothing: A lot  Toileting, which includes using toilet, bedpan or urinal: A lot  Taking care of personal grooming such as brushing teeth: A little  Eating Meals: A little  Daily Activity - Total Score: 14      Education Documentation  Precautions, taught by Sierra Damian OT at 8/8/2025  4:53 PM.  Learner: Patient  Readiness: Acceptance  Method: Explanation  Response: Needs Reinforcement  Comment: Pt educated in purpose of OT, POC. Pt instructed in surgical  precautions, educaetd in hand/ finger exercises for edema control / ROM.    ADL Training, taught by Sierra Damian OT at 8/8/2025  4:53 PM.  Learner: Patient  Readiness: Acceptance  Method: Explanation  Response: Needs Reinforcement  Comment: Pt educated in purpose of OT, POC. Pt instructed in surgical precautions, educaetd in hand/ finger exercises for edema control / ROM.    Education Comments  No comments found.      Goals:   Encounter Problems       Encounter Problems (Active)       OT Goals       ADL's (Progressing)       Start:  08/08/25    Expected End:  08/22/25       Patient will complete ADL tasks, with supervision while maintaining NWB Lt UE using AE / compensatory tech need in order to increase patient's safety and independence with self-care tasks.           Functional transfers (Progressing)       Start:  08/08/25    Expected End:  08/22/25       Patient will complete functional transfers with supervision using least restrictive device, in order to increase patient's safety and independence with daily tasks.           Precautions (Progressing)       Start:  08/08/25    Expected End:  08/22/25       Pt will verbalize/ demon surgical precautions 100% of the time for increased independence in daily tasks  and functional mobility.         Activity tolerance (Progressing)       Start:  08/08/25    Expected End:  08/22/25       Patient will demonstrate the ability to participate in functional activity at least >/= 20 minutes in order to increase patient's safety and independence with daily tasks.

## 2025-08-08 NOTE — CARE PLAN
The patient's goals for the shift include      The clinical goals for the shift include rest      Problem: Pain - Adult  Goal: Verbalizes/displays adequate comfort level or baseline comfort level  Outcome: Progressing     Problem: Safety - Adult  Goal: Free from fall injury  Outcome: Progressing     Problem: Discharge Planning  Goal: Discharge to home or other facility with appropriate resources  Outcome: Progressing     Problem: Chronic Conditions and Co-morbidities  Goal: Patient's chronic conditions and co-morbidity symptoms are monitored and maintained or improved  Outcome: Progressing     Problem: Nutrition  Goal: Nutrient intake appropriate for maintaining nutritional needs  Outcome: Progressing

## 2025-08-08 NOTE — H&P
"History Of Present Illness  Abhinav Rangel is a 69 y.o. male presenting with complaint of left shoulder.  Patient patient underwent a course left shoulder arthroplasty.  Medical consult was obtained for his medical problems including neuropathy, amyloidosis, depression, hypertension, kidney stones and spinal stenosis.  The time of examination he was lying in the bed, comfortable, did not look in acute distress.  Patient denied any nausea, vomiting or diarrhea, dysuria hematuria frequency     Past Medical History  Medical History[1]    Surgical History  Surgical History[2]     Social History  He reports that he has never smoked. He has never used smokeless tobacco. He reports current alcohol use of about 2.0 standard drinks of alcohol per week. He reports that he does not use drugs.    Family History  Family History[3]     Allergies  Hydromorphone and Gabapentin    Review of Systems  I reviewed all systems reviewed as above otherwise is negative.  Physical Exam  HEENT:  Head externally atraumatic, no pallor, no icterus, extraocular movements intact, pupils reactive to light, oral mucosa moist and throat clear.  Neck:  Supple, no JVP, no palpable adenopathy or thyromegaly.  No carotid bruit.  Chest:  Clear to auscultation and resonant.  Heart:  Regular rate and rhythm, no murmur or gallop could be appreciated.  Abdomen:  Soft, nontender, obese, bowel sounds present, normoactive, no palpable hepatosplenomegaly.  Extremities:  No edema, pulses present, no cyanosis or clubbing.  CNS:  Patient alert, oriented to time, place and person.  Power 5/5 all over and deep tendon reflexes symmetrical, cranial nerves 2-12 grossly intact.  Skin:  No active rash.  Musculoskeletal:  No joint swelling or erythema, range of movement normal.  Left arm is in a sling.  Last Recorded Vitals  Heart Rate:  []   Temp:  [36.1 °C (97 °F)-36.8 °C (98.2 °F)]   Resp:  [11-25]   BP: ()/()   Height:  [177.8 cm (5' 10\")]   Weight:  " [134 kg (295 lb 6.7 oz)]   SpO2:  [92 %-100 %]       Relevant Results      No results found for this or any previous visit (from the past 24 hours).  Prior to Admission medications   Medication Sig Start Date End Date Taking? Authorizing Provider   acetaminophen (Tylenol 8 HOUR) 650 mg ER tablet Take 2 tablets (1,300 mg) by mouth every 8 hours if needed for mild pain (1 - 3). Do not crush, chew, or split.   Yes Historical Provider, MD   carisoprodol (Soma) 350 mg tablet Take 1 tablet (350 mg) by mouth 3 times a day as needed for muscle spasms.   Yes Historical Provider, MD   chlorhexidine (Peridex) 0.12 % solution Use as directed. 7/17/25 8/8/25 Yes MAEGAN Knott-CNP   cyanocobalamin (Vitamin B-12) 1,000 mcg/mL injection Inject 1 mL (1,000 mcg) into the muscle 1 time. Twice per month   Yes Historical Provider, MD   HYDROcodone-acetaminophen (Norco)  mg tablet Take 1 tablet by mouth every 4 hours if needed.   Yes Historical Provider, MD   lisinopril 40 mg tablet Take 1 tablet (40 mg) by mouth once daily.   Yes Historical Provider, MD   verapamil ER (Veralan PM) 180 mg 24 hr capsule Take 1 capsule (180 mg) by mouth once daily.   Yes Historical Provider, MD   amoxicillin (Amoxil) 500 mg capsule TAKE 4 CAPSULES 1 HOUR PRIOR TO DENTAL WORK    Historical Provider, MD   naloxone (Narcan) 4 mg/0.1 mL nasal spray Administer 1 spray (4 mg) into affected nostril(s) if needed. 6/11/25   Historical Provider, MD   oxyCODONE-acetaminophen (Percocet) 5-325 mg tablet Take 1 tablet by mouth every 6 hours if needed for severe pain (7 - 10) for up to 6 days. 8/8/25 8/15/25  Jose Calvert MD     Current Medications[4]  Imaging  XR shoulder left 2+ views  Result Date: 8/8/2025  Status post left shoulder arthroplasty.   MACRO: none   Signed by: Jeanmarie Michael 8/8/2025 12:42 PM Dictation workstation:   [id number]      Cardiology, Vascular, and Other Imaging  No other imaging results found for the past 7 days    No  results found for the last 90 days.       Assessment/Plan   Assessment & Plan  Arthritis of left shoulder region  Low back pain  Spinal stenosis  Amyloidosis  Hypertension  Depression  Plan: Continue current medication.  Supportive care.  Physical therapy Occupational Therapy.  Monitor CBC and BMP.  Control pain.  Monitor blood pressure.  We will take DVT, fall, aspiration, decubitus, and DVT precaution.  EKG is reviewed and ordered.                 Darian Valdes MD        [1]   Past Medical History:  Diagnosis Date    Amyloidosis     Depression     Hypertension     Kidney stones     Neuropathy     Spinal stenosis    [2]   Past Surgical History:  Procedure Laterality Date    ANKLE SURGERY Left     APPENDECTOMY      CARDIAC CATHETERIZATION  2016    60% lesion in LAD, Normal EF; no intervention required.    CARPAL TUNNEL RELEASE Bilateral     FOOT SURGERY Left     KIDNEY STONE SURGERY      OTHER SURGICAL HISTORY  09/18/2020    Thoracic and lumbar vertebral fusion x 7 surgeries    OTHER SURGICAL HISTORY Bilateral 09/18/2020    Knee replacement    OTHER SURGICAL HISTORY  09/18/2020    ORIF metatarsal    OTHER SURGICAL HISTORY  09/18/2020    Neck surgery x 2 surgeries    OTHER SURGICAL HISTORY  09/18/2020    Appendectomy    OTHER SURGICAL HISTORY  09/18/2020    Shoulder replacement    ROTATOR CUFF REPAIR Left     UMBILICAL HERNIA REPAIR     [3] No family history on file.  [4]   Current Facility-Administered Medications:     acetaminophen (Tylenol) tablet 650 mg, 650 mg, oral, q6h [facility], Jose Calvert MD    ceFAZolin (Ancef) 3 g in sodium chloride 0.9%  mL, 3 g, intravenous, q8h, Jose Calvert MD, 3 g at 08/08/25 1631    [START ON 8/9/2025] fondaparinux (Arixtra) injection 2.5 mg, 2.5 mg, subcutaneous, q24h, Jose Calvert MD    lactated Ringer's infusion, 50 mL/hr, intravenous, Continuous, Jose Calvert MD, Last Rate: 50 mL/hr at 08/08/25 1504, 50 mL/hr at 08/08/25 1504    morphine injection 2  mg, 2 mg, intravenous, q4h PRN, Jose Calvert MD, 2 mg at 08/08/25 1631    ondansetron ODT (Zofran-ODT) disintegrating tablet 4 mg, 4 mg, oral, q8h PRN **OR** ondansetron (Zofran) injection 4 mg, 4 mg, intravenous, q8h PRN, Jose Calvert MD

## 2025-08-08 NOTE — DISCHARGE INSTRUCTIONS
Keep dressing dry.  Sling for affected arm  Okay to move elbow wrist and hand.  No motion shoulder without physical therapy.  Ice to shoulder as needed.  Follow-up with physical therapy 1 week.  Follow-up with me 10 days.

## 2025-08-09 ENCOUNTER — PHARMACY VISIT (OUTPATIENT)
Dept: PHARMACY | Facility: CLINIC | Age: 70
End: 2025-08-09
Payer: MEDICARE

## 2025-08-09 VITALS
BODY MASS INDEX: 42.29 KG/M2 | DIASTOLIC BLOOD PRESSURE: 73 MMHG | WEIGHT: 295.42 LBS | SYSTOLIC BLOOD PRESSURE: 162 MMHG | HEIGHT: 70 IN | HEART RATE: 90 BPM | RESPIRATION RATE: 17 BRPM | OXYGEN SATURATION: 96 % | TEMPERATURE: 97.7 F

## 2025-08-09 PROCEDURE — 2500000001 HC RX 250 WO HCPCS SELF ADMINISTERED DRUGS (ALT 637 FOR MEDICARE OP): Performed by: INTERNAL MEDICINE

## 2025-08-09 PROCEDURE — 2500000004 HC RX 250 GENERAL PHARMACY W/ HCPCS (ALT 636 FOR OP/ED): Performed by: ORTHOPAEDIC SURGERY

## 2025-08-09 PROCEDURE — 2500000001 HC RX 250 WO HCPCS SELF ADMINISTERED DRUGS (ALT 637 FOR MEDICARE OP): Performed by: ORTHOPAEDIC SURGERY

## 2025-08-09 PROCEDURE — 96372 THER/PROPH/DIAG INJ SC/IM: CPT | Performed by: ORTHOPAEDIC SURGERY

## 2025-08-09 PROCEDURE — 7100000011 HC EXTENDED STAY RECOVERY HOURLY - NURSING UNIT

## 2025-08-09 RX ADMIN — Medication 1000 MCG: at 08:59

## 2025-08-09 RX ADMIN — MORPHINE SULFATE 2 MG: 2 INJECTION, SOLUTION INTRAMUSCULAR; INTRAVENOUS at 10:43

## 2025-08-09 RX ADMIN — OXYCODONE HYDROCHLORIDE AND ACETAMINOPHEN 1 TABLET: 5; 325 TABLET ORAL at 08:59

## 2025-08-09 RX ADMIN — CARISOPRODOL 350 MG: 350 TABLET ORAL at 09:20

## 2025-08-09 RX ADMIN — FONDAPARINUX SODIUM 2.5 MG: 2.5 INJECTION SUBCUTANEOUS at 08:59

## 2025-08-09 RX ADMIN — ACETAMINOPHEN 650 MG: 325 TABLET ORAL at 05:26

## 2025-08-09 RX ADMIN — CARISOPRODOL 350 MG: 350 TABLET ORAL at 00:32

## 2025-08-09 RX ADMIN — MORPHINE SULFATE 2 MG: 2 INJECTION, SOLUTION INTRAMUSCULAR; INTRAVENOUS at 05:48

## 2025-08-09 ASSESSMENT — PAIN - FUNCTIONAL ASSESSMENT
PAIN_FUNCTIONAL_ASSESSMENT: 0-10
PAIN_FUNCTIONAL_ASSESSMENT: UNABLE TO SELF-REPORT
PAIN_FUNCTIONAL_ASSESSMENT: 0-10
PAIN_FUNCTIONAL_ASSESSMENT: 0-10

## 2025-08-09 ASSESSMENT — PAIN DESCRIPTION - DESCRIPTORS
DESCRIPTORS: DISCOMFORT;THROBBING
DESCRIPTORS: DISCOMFORT;SORE

## 2025-08-09 ASSESSMENT — PAIN SCALES - GENERAL
PAINLEVEL_OUTOF10: 7
PAINLEVEL_OUTOF10: 3
PAINLEVEL_OUTOF10: 7
PAINLEVEL_OUTOF10: 7
PAINLEVEL_OUTOF10: 5 - MODERATE PAIN

## 2025-08-09 ASSESSMENT — PAIN DESCRIPTION - LOCATION
LOCATION: BACK
LOCATION: SHOULDER
LOCATION: SHOULDER

## 2025-08-09 ASSESSMENT — PAIN DESCRIPTION - ORIENTATION
ORIENTATION: LEFT
ORIENTATION: LEFT

## 2025-08-09 NOTE — CARE PLAN
The patient's goals for the shift include safety and comfort.    The clinical goals for the shift include pain management and monitor vital signs.

## 2025-08-09 NOTE — PROGRESS NOTES
Spiritual Care Visit  Spiritual Care Request    Reason for Visit:  Routine Visit: Introduction     Request Received From:       Focus of Care:  Visited With: Patient         Refer to :          Spiritual Care Assessment    Spiritual Assessment:                      Care Provided:  Intended Effects: Build relationship of care and support, Convey a calming presence, Demonstrate caring and concern    Sense of Community and or Sikh Affiliation:  Lutheran   Values/Beliefs  Spiritual Requests During Hospitalization: Ed asked to be anointed and to have Communion.     Addressed Needs/Concerns and/or Mario Through:     Sacramental Encounters  Communion: Patient wants communion  Communion Given Indicator: Yes  Sacrament of Sick-Anointing: Anointed    Outcome:        Advance Directives:         Spiritual Care Annotation    Annotation:  Ed asked to be anointed and to have Communion today.  Russell Deluca

## 2025-08-09 NOTE — PROGRESS NOTES
"Abhinav Rangel is a 69 y.o. male on day 0 of admission presenting with Arthritis of left shoulder region.    Subjective   Pain well-controlled       Objective     Physical Exam  Small amount of bloody drainage in superior portion of an bandage.  Able to move elbow wrist and hand.  Sensations intact to light touch radial pulse palpable  Last Recorded Vitals  Blood pressure 162/73, pulse 90, temperature 36.5 °C (97.7 °F), temperature source Temporal, resp. rate 17, height 1.778 m (5' 10\"), weight 134 kg (295 lb 6.7 oz), SpO2 96%.       Intake/Output last 3 Shifts:  I/O last 3 completed shifts:  In: 2425.8 (18.1 mL/kg) [P.O.:800; I.V.:425.8 (3.2 mL/kg); IV Piggyback:1200]  Out: 2150 (16 mL/kg) [Urine:2050 (0.4 mL/kg/hr); Blood:100]  Weight: 134 kg     Relevant Results      Scheduled medications  Scheduled Medications[1]  Continuous medications  Continuous Medications[2]  PRN medications  PRN Medications[3]  No results found for this or any previous visit (from the past 24 hours).                         Assessment & Plan  Arthritis of left shoulder region    Doing well after left reverse total shoulder arthroplasty    For discharge today.  Follow-up with me in 9 days.  Percocet for pain.  Left arm sling.    I spent 10 minutes in the professional and overall care of this patient.      Jose Calvert MD           [1] acetaminophen, 650 mg, oral, q6h JUAN PABLO  carisoprodol, 350 mg, oral, TID  chlorhexidine, 15 mL, Swish & Spit, TID  cyanocobalamin, 1,000 mcg, intramuscular, Once  cyanocobalamin, 1,000 mcg, oral, Daily  fondaparinux, 2.5 mg, subcutaneous, q24h  lisinopril, 40 mg, oral, Nightly    [2] lactated Ringer's, 50 mL/hr, Last Rate: Stopped (08/08/25 1900)    [3] PRN medications: HYDROcodone-acetaminophen, morphine, ondansetron ODT **OR** ondansetron, oxyCODONE-acetaminophen    "

## 2025-08-09 NOTE — PROGRESS NOTES
08/09/25 0930   Discharge Planning   Expected Discharge Disposition Home     [facility] spoke to patient regarding therapy recommendations. Patient declining HHC at this time, will turn home with no needs.

## 2025-08-09 NOTE — CARE PLAN
The patient's goals for the shift include  manage pain, safety, go home.     The clinical goals for the shift include manage pain, monitor labs and VS, PT/OT, encourage activity, maintain safety, promote rest, discharge planning, discharge home.    No barriers, patient discharged.       Problem: Pain - Adult  Goal: Verbalizes/displays adequate comfort level or baseline comfort level  Outcome: Adequate for Discharge     Problem: Safety - Adult  Goal: Free from fall injury  Outcome: Adequate for Discharge     Problem: Discharge Planning  Goal: Discharge to home or other facility with appropriate resources  Outcome: Adequate for Discharge     Problem: Chronic Conditions and Co-morbidities  Goal: Patient's chronic conditions and co-morbidity symptoms are monitored and maintained or improved  Outcome: Adequate for Discharge     Problem: Nutrition  Goal: Nutrient intake appropriate for maintaining nutritional needs  Outcome: Adequate for Discharge     Problem: Fall/Injury  Goal: Not fall by end of shift  Outcome: Adequate for Discharge  Goal: Be free from injury by end of the shift  Outcome: Adequate for Discharge  Goal: Verbalize understanding of personal risk factors for fall in the hospital  Outcome: Adequate for Discharge  Goal: Verbalize understanding of risk factor reduction measures to prevent injury from fall in the home  Outcome: Adequate for Discharge  Goal: Use assistive devices by end of the shift  Outcome: Adequate for Discharge  Goal: Pace activities to prevent fatigue by end of the shift  Outcome: Adequate for Discharge     Problem: Pain  Goal: Takes deep breaths with improved pain control throughout the shift  Outcome: Adequate for Discharge  Goal: Turns in bed with improved pain control throughout the shift  Outcome: Adequate for Discharge  Goal: Walks with improved pain control throughout the shift  Outcome: Adequate for Discharge  Goal: Performs ADL's with improved pain control throughout  shift  Outcome: Adequate for Discharge  Goal: Participates in PT with improved pain control throughout the shift  Outcome: Adequate for Discharge  Goal: Free from opioid side effects throughout the shift  Outcome: Adequate for Discharge  Goal: Free from acute confusion related to pain meds throughout the shift  Outcome: Adequate for Discharge

## (undated) DEVICE — WOUND SYSTEM, DEBRIDEMENT & CLEANING, O.R DUOPAK

## (undated) DEVICE — TUBING, IRRIGATION, HIGH FLOW, HAND PIECE SET

## (undated) DEVICE — CAUTERY, PENCIL, PUSH BUTTON, SMOKE EVAC, 70MM

## (undated) DEVICE — GLOVE, SURGICAL, PROTEXIS PI BLUE W/NEUTHERA, 7.5, PF, LF

## (undated) DEVICE — PACK, BEACH CHAIR SHOULDER

## (undated) DEVICE — SUTURE, VICRYL, 3-0, 27 IN, CT-1, UNDYED

## (undated) DEVICE — SUTURE TAPE, 1.3MM 40IN, BLK/WH, W/TAPER NDL 36.6MM, DARK BLUE, NONABSORB

## (undated) DEVICE — Device

## (undated) DEVICE — RETRIEVER, SUTURE, HEWSON

## (undated) DEVICE — DRAPE, SHEET, LARGE, 70 X 85IN, STERILE

## (undated) DEVICE — SUTURE, VICRYL, 0, 36 IN, CT-1, UNDYED

## (undated) DEVICE — DRAPE, SHEET, U, W/ADHESIVE STRIP, IMPERVIOUS, 60 X 70 IN, DISPOSABLE, LF, STERILE

## (undated) DEVICE — ELECTRODE, ELECTROSURGICAL, PENCIL, HAND CONTROL, BLADE, W/SMOKE EVACUATION, W/HOLSTER, 10 FT CORD

## (undated) DEVICE — BLADE, SAW, SAGITTAL, 21 X 84.5 X 0.89 MM, STAINLESS STEEL, STERILE

## (undated) DEVICE — DRAPE, INCISE, ANTIMICROBIAL, IOBAN 2, 13 X 13 IN, DISPOSABLE, STERILE

## (undated) DEVICE — STRIP, SKIN CLOSURE, STERI STRIP, REINFORCED, 0.5 X 4 IN

## (undated) DEVICE — SUTURE TAPE, 1.3MM 40IN, BLK/WH, W/TAPER NDL 36.6MM

## (undated) DEVICE — INTERPULSE HANDPIECE SET W/ 10FT SUCTION TUBING

## (undated) DEVICE — DRILL BIT, 3.0MM GLENOID, V-SHAPE FLUTE, STERILE

## (undated) DEVICE — STOCKINETTE, IMPERVIOUS, 12 X 48 IN, LF, STERILE

## (undated) DEVICE — SPONGE, LAP, XRAY DECT, SC+RFID, 18X18, NO LOOP, STERILE

## (undated) DEVICE — GLOVE, SURGICAL, PROTEXIS PI ORTHO, 7.5, PF, LF

## (undated) DEVICE — SUTURE, MONOCRYL, 4-0, 27 IN, PS-2, UNDYED

## (undated) DEVICE — GOWN, SURGICAL, SIRUS, NON REINFORCED, LARGE

## (undated) DEVICE — IMMOBILIZER, ULTRASLING III, LARGE

## (undated) DEVICE — BANDAGE, COBAN 2, LAYER LITE COMPRESSION, 4 IN, LF